# Patient Record
Sex: FEMALE | Race: WHITE | NOT HISPANIC OR LATINO | Employment: OTHER | URBAN - METROPOLITAN AREA
[De-identification: names, ages, dates, MRNs, and addresses within clinical notes are randomized per-mention and may not be internally consistent; named-entity substitution may affect disease eponyms.]

---

## 2017-02-20 RX ORDER — TOPIRAMATE 50 MG/1
TABLET, FILM COATED ORAL
Qty: 180 TAB | Refills: 0 | Status: SHIPPED | OUTPATIENT
Start: 2017-02-20 | End: 2017-04-20 | Stop reason: SDUPTHER

## 2017-02-21 RX ORDER — PAROXETINE HYDROCHLORIDE 20 MG/1
TABLET, FILM COATED ORAL
OUTPATIENT
Start: 2017-02-21

## 2017-03-20 DIAGNOSIS — G47.00 INSOMNIA, UNSPECIFIED TYPE: ICD-10-CM

## 2017-03-20 DIAGNOSIS — F41.9 ANXIETY: ICD-10-CM

## 2017-03-20 NOTE — TELEPHONE ENCOUNTER
Was the patient seen in the last year in this department? Yes     Does patient have an active prescription for medications requested? No     Received Request Via: Patient del

## 2017-03-20 NOTE — TELEPHONE ENCOUNTER
----- Message from Your Healthcare Team sent at 3/18/2017 11:26 PM PDT -----  Regarding: Prescription Question  Contact: [phone]  I will run out of medications on the following days and I do t gave refills listed on the prescription screen:  Clonazepam 0.5 mg 4/1  Paroxetine 20 mg 4/23  Amitriptyline 25 mg 4/21  Can you please fax scripts to [facility] in [place]?  I am up to date on my physicals.  Thanks

## 2017-03-21 RX ORDER — AMITRIPTYLINE HYDROCHLORIDE 25 MG/1
25 TABLET, FILM COATED ORAL NIGHTLY PRN
Qty: 90 TAB | Refills: 3 | Status: SHIPPED | OUTPATIENT
Start: 2017-03-21 | End: 2018-03-20 | Stop reason: SDUPTHER

## 2017-03-21 RX ORDER — PAROXETINE HYDROCHLORIDE 20 MG/1
20 TABLET, FILM COATED ORAL DAILY
Qty: 90 TAB | Refills: 3 | Status: SHIPPED | OUTPATIENT
Start: 2017-03-21 | End: 2018-03-20 | Stop reason: SDUPTHER

## 2017-03-21 RX ORDER — CLONAZEPAM 0.5 MG/1
TABLET ORAL
Qty: 180 TAB | Refills: 1 | Status: SHIPPED
Start: 2017-03-21 | End: 2017-09-29 | Stop reason: SDUPTHER

## 2017-04-20 RX ORDER — TOPIRAMATE 50 MG/1
50 TABLET, FILM COATED ORAL 2 TIMES DAILY
Qty: 180 TAB | Refills: 3 | Status: SHIPPED | OUTPATIENT
Start: 2017-04-20 | End: 2018-03-28 | Stop reason: SDUPTHER

## 2017-04-20 NOTE — TELEPHONE ENCOUNTER
From: Valerie Alfredo  To: Kayy Fabian M.D.  Sent: 4/20/2017 11:24 AM PDT  Subject: Medication Renewal Request    Original authorizing provider: FLORY Geiger would like a refill of the following medications:  topiramate (TOPAMAX) 50 MG tablet [Kayy Fabian M.D.]    Preferred pharmacy: [facility] [address] [facility], [place] - [facility] [address]    Comment:

## 2017-05-17 DIAGNOSIS — G43.901 MIGRAINE WITH STATUS MIGRAINOSUS, NOT INTRACTABLE, UNSPECIFIED MIGRAINE TYPE: ICD-10-CM

## 2017-05-18 RX ORDER — SUMATRIPTAN 100 MG/1
100 TABLET, FILM COATED ORAL
Qty: 18 TAB | Refills: 0 | Status: SHIPPED | OUTPATIENT
Start: 2017-05-18 | End: 2017-05-18 | Stop reason: SDUPTHER

## 2017-05-18 RX ORDER — SUMATRIPTAN 100 MG/1
100 TABLET, FILM COATED ORAL
Qty: 18 TAB | Refills: 0 | Status: SHIPPED | OUTPATIENT
Start: 2017-05-18 | End: 2017-10-18 | Stop reason: SDUPTHER

## 2017-05-18 NOTE — TELEPHONE ENCOUNTER
Was the patient seen in the last year in this department? Yes     Does patient have an active prescription for medications requested? No     Received Request Via: Pharmacy      Pt met protocol?: Yes    LAST OV 11/08/2016

## 2017-05-18 NOTE — TELEPHONE ENCOUNTER
From: Valerie Alfredo  To: Kayy Fabian M.D.  Sent: 5/17/2017 10:18 PM PDT  Subject: Medication Renewal Request    Original authorizing provider: FLORY Geiger would like a refill of the following medications:  sumatriptan (IMITREX) 100 MG tablet [Kayy Fabian M.D.]    Preferred pharmacy: [facility] - [address]    Comment:

## 2017-09-29 DIAGNOSIS — F41.9 ANXIETY: ICD-10-CM

## 2017-09-29 RX ORDER — CLONAZEPAM 0.5 MG/1
TABLET ORAL
Qty: 180 TAB | Refills: 1 | Status: SHIPPED
Start: 2017-09-29 | End: 2018-06-27 | Stop reason: SDUPTHER

## 2017-09-29 RX ORDER — CLONAZEPAM 0.5 MG/1
TABLET ORAL
Qty: 60 TAB | Refills: 2 | Status: SHIPPED
Start: 2017-09-29 | End: 2019-12-17

## 2017-09-29 NOTE — TELEPHONE ENCOUNTER
**My-chart-I have a 3 day supply of clonazepam. The screen says I have 1 refill but I can't request it. My pharmacy shows I'm out of refills. My last wellness appointment was Nov 8, 2016 and I have my next appointment scheduled on time for Nov 9. Can you refill up through my appointment date? Thank you-Valerie     Was the patient seen in the last year in this department? Yes     Does patient have an active prescription for medications requested? No     Received Request Via: Patient

## 2017-09-29 NOTE — TELEPHONE ENCOUNTER
Was the patient seen in the last year in this department? Yes 11/08/17    Does patient have an active prescription for medications requested? No     Received Request Via: Pharmacy

## 2017-10-18 DIAGNOSIS — G43.901 MIGRAINE WITH STATUS MIGRAINOSUS, NOT INTRACTABLE, UNSPECIFIED MIGRAINE TYPE: ICD-10-CM

## 2017-10-18 RX ORDER — SUMATRIPTAN 100 MG/1
100 TABLET, FILM COATED ORAL
Qty: 18 TAB | Refills: 0 | OUTPATIENT
Start: 2017-10-18

## 2017-10-18 NOTE — TELEPHONE ENCOUNTER
Was the patient seen in the last year in this department? Yes     Does patient have an active prescription for medications requested? No     Received Request Via: Patient      Pt met protocol?: Yes, last ov 11/8/16, has appt scheduled 11/6/17  Last labs 11/16

## 2017-10-18 NOTE — TELEPHONE ENCOUNTER
From: Valerie Alfredo  Sent: 10/18/2017 9:45 AM PDT  Subject: Medication Renewal Request    Valerie Alfredo would like a refill of the following medications:  sumatriptan (IMITREX) 100 MG tablet [Kayy Fabian M.D.]    Preferred pharmacy: [facility] [id number] [facility] - [place], [place] - [facility]    Comment:  Please send refill to [facility] at [facility] [phone]. I show 1 refill but they show it’s  and I’m out. Thanks

## 2017-10-19 RX ORDER — SUMATRIPTAN 100 MG/1
TABLET, FILM COATED ORAL
Qty: 18 TAB | Refills: 0 | Status: SHIPPED | OUTPATIENT
Start: 2017-10-19 | End: 2018-03-22 | Stop reason: SDUPTHER

## 2017-11-06 ENCOUNTER — HOSPITAL ENCOUNTER (OUTPATIENT)
Dept: RADIOLOGY | Facility: MEDICAL CENTER | Age: 52
End: 2017-11-06
Attending: FAMILY MEDICINE
Payer: COMMERCIAL

## 2017-11-06 DIAGNOSIS — Z12.39 SCREENING BREAST EXAMINATION: ICD-10-CM

## 2017-11-06 PROCEDURE — G0202 SCR MAMMO BI INCL CAD: HCPCS

## 2017-11-21 ENCOUNTER — OFFICE VISIT (OUTPATIENT)
Dept: MEDICAL GROUP | Facility: PHYSICIAN GROUP | Age: 52
End: 2017-11-21
Payer: COMMERCIAL

## 2017-11-21 VITALS
RESPIRATION RATE: 12 BRPM | BODY MASS INDEX: 19.49 KG/M2 | TEMPERATURE: 97.5 F | HEART RATE: 96 BPM | HEIGHT: 65 IN | SYSTOLIC BLOOD PRESSURE: 120 MMHG | DIASTOLIC BLOOD PRESSURE: 82 MMHG | OXYGEN SATURATION: 95 % | WEIGHT: 117 LBS

## 2017-11-21 DIAGNOSIS — Z00.00 WELL ADULT EXAM: ICD-10-CM

## 2017-11-21 DIAGNOSIS — Z13.29 SCREENING FOR ENDOCRINE DISORDER: ICD-10-CM

## 2017-11-21 DIAGNOSIS — G43.119 INTRACTABLE MIGRAINE WITH AURA WITHOUT STATUS MIGRAINOSUS: ICD-10-CM

## 2017-11-21 DIAGNOSIS — Z13.6 SCREENING FOR CARDIOVASCULAR CONDITION: ICD-10-CM

## 2017-11-21 DIAGNOSIS — Z11.59 ENCOUNTER FOR HEPATITIS C SCREENING TEST FOR LOW RISK PATIENT: ICD-10-CM

## 2017-11-21 DIAGNOSIS — Z23 NEED FOR TDAP VACCINATION: ICD-10-CM

## 2017-11-21 PROCEDURE — 90715 TDAP VACCINE 7 YRS/> IM: CPT | Performed by: FAMILY MEDICINE

## 2017-11-21 PROCEDURE — 99396 PREV VISIT EST AGE 40-64: CPT | Mod: 25 | Performed by: FAMILY MEDICINE

## 2017-11-21 PROCEDURE — 90471 IMMUNIZATION ADMIN: CPT | Performed by: FAMILY MEDICINE

## 2017-11-21 RX ORDER — PROMETHAZINE HYDROCHLORIDE 25 MG/1
25 SUPPOSITORY RECTAL EVERY 6 HOURS PRN
Qty: 10 SUPPOSITORY | Refills: 0 | Status: SHIPPED | OUTPATIENT
Start: 2017-11-21

## 2017-11-21 RX ORDER — HYDROMORPHONE HYDROCHLORIDE 2 MG/1
2-4 TABLET ORAL
Qty: 15 TAB | Refills: 0 | Status: SHIPPED | OUTPATIENT
Start: 2017-11-21 | End: 2017-12-07 | Stop reason: SDUPTHER

## 2017-11-21 RX ORDER — PROMETHAZINE HYDROCHLORIDE 25 MG/1
25 TABLET ORAL EVERY 6 HOURS PRN
Qty: 30 TAB | Refills: 0 | Status: SHIPPED | OUTPATIENT
Start: 2017-11-21 | End: 2020-12-18

## 2017-11-21 ASSESSMENT — PATIENT HEALTH QUESTIONNAIRE - PHQ9: CLINICAL INTERPRETATION OF PHQ2 SCORE: 0

## 2017-11-21 NOTE — PROGRESS NOTES
Chief Complaint   Patient presents with   • Annual Exam       HISTORY OF PRESENT ILLNESS: Patient is a 52 y.o. female new patient who presents today to discuss the following issues:    1. Well adult exam  Here today for annual wellness visit.  Reports good health.  Did have 4 ER visits for intractable migraines this year when she failed home treatment with imitrex.  She typically recieves anti-emetics and opioids in the ER and breaks the migraine.  She is hoping for a plan to help avoid ER.  Due for Tdap.  Requests screening labs, including Hep C.        Active Ambulatory Problems     Diagnosis Date Noted   • Migraines 10/19/2012   • Insomnia 10/19/2012   • Anxiety 10/19/2012   • Menopausal symptoms 10/19/2012   • Allergic rhinitis 10/19/2012   • Foot pain, left 10/19/2012     Resolved Ambulatory Problems     Diagnosis Date Noted   • No Resolved Ambulatory Problems     Past Medical History:   Diagnosis Date   • Insomnia    • Migraine        Allergies:Bactrim and Sulfa drugs    Current Outpatient Prescriptions   Medication Sig Dispense Refill   • HYDROmorphone (DILAUDID) 2 MG Tab Take 1-2 Tabs by mouth every 3 hours as needed for Severe Pain. 15 Tab 0   • promethazine (PHENERGAN) 25 MG Suppos Insert 1 Suppository in rectum every 6 hours as needed for Nausea/Vomiting. 10 Suppository 0   • promethazine (PHENERGAN) 25 MG Tab Take 1 Tab by mouth every 6 hours as needed for Nausea/Vomiting. 30 Tab 0   • sumatriptan (IMITREX) 100 MG tablet TAKE ONE TABLET BY MOUTH ONCE AS NEEDED FOR MIGRAINE FOR UP TO ONE DOSE 18 Tab 0   • clonazepam (KLONOPIN) 0.5 MG Tab TAKE ONE TABLET BY MOUTH TWICE DAILY AS NEEDED 60 Tab 2   • clonazepam (KLONOPIN) 0.5 MG Tab TAKE ONE TABLET BY MOUTH TWICE DAILY AS NEEDED 180 Tab 1   • topiramate (TOPAMAX) 50 MG tablet Take 1 Tab by mouth 2 times a day. Take 1 tablet by mouth  every 12 hours ( two times  daily ) 180 Tab 3   • paroxetine (PAXIL) 20 MG Tab Take 1 Tab by mouth every day. 90 Tab 3   •  "amitriptyline (ELAVIL) 25 MG Tab Take 1 Tab by mouth at bedtime as needed. FOR SLEEP 90 Tab 3     No current facility-administered medications for this visit.        Social History   Substance Use Topics   • Smoking status: Never Smoker   • Smokeless tobacco: Never Used   • Alcohol use No       Family Status   Relation Status   • Mother    • Father      Family History   Problem Relation Age of Onset   • Cancer Mother    • Other Father      scleroderma     Health Maintenance Due   Topic Date Due   • IMM DTaP/Tdap/Td Vaccine (1 - Tdap) 1984   • IMM INFLUENZA (1) 2017       ROS:  Review of Systems   Constitutional: Negative for fever, chills, weight loss and malaise/fatigue.   HENT: Negative for ear pain, nosebleeds, congestion, sore throat and neck pain.    Eyes: Negative for blurred vision.   Respiratory: Negative for cough, sputum production, shortness of breath and wheezing.    Cardiovascular: Negative for chest pain, palpitations, orthopnea and leg swelling.   Gastrointestinal: Negative for heartburn, nausea, vomiting and abdominal pain.   Genitourinary: Negative for dysuria, urgency and frequency.   Musculoskeletal: Negative for myalgias, back pain and joint pain.   Skin: Negative for rash and itching.   Neurological: Negative for dizziness, tingling, tremors, sensory change, focal weakness and headaches.   Endo/Heme/Allergies: Does not bruise/bleed easily.   Psychiatric/Behavioral: Negative for depression, suicidal ideas and memory loss.  The patient is not nervous/anxious and does have insomnia.      Exam:  Blood pressure 120/82, pulse 96, temperature 36.4 °C (97.5 °F), resp. rate 12, height 1.651 m (5' 5\"), weight 53.1 kg (117 lb), SpO2 95 %.  General:  Well nourished, well developed female in NAD  HEENT: Normocephalic and atraumatic. TMs clear bilaterally. Oropharynx is clear      without erythema and no tonsillar exudate. Nasal mucosa pink with minimal      Rhinorrhea.  EYES: " EOMI.  Conjunctiva clear.    Neck: Supple without JVD or bruit. Thyroid is not enlarged.  Pulmonary: Clear to ausculation and percussion.  Normal effort. No rales, ronchi, or wheezing.  Cardiovascular: Regular rate and rhythm without murmur, no peripheral edema  Abdomen: positive bowel sounds.  Non tender, non distended, no hepatosplenomegaly.   MSK: normal ROM in upper and lower extremities bilaterally  Psych: appropriate affect and concentration, oriented to person and place  Neuro: no unilateral weakness in upper or lower extremities.  No gait disturbance    Please note that this dictation was created using voice recognition software. I have made every reasonable attempt to correct obvious errors, but I expect that there are errors of grammar and possibly content that I did not discover before finalizing the note.    Assessment/Plan:  1. Well adult exam  Check labs  Continue healthy lifestyle.   - COMP METABOLIC PANEL; Future  - LIPID PROFILE; Future  - TSH WITH REFLEX TO FT4; Future  - VITAMIN D,25 HYDROXY; Future    2. Need for Tdap vaccination    - TDAP VACCINE =>8YO IM    3. Screening for cardiovascular condition    - COMP METABOLIC PANEL; Future  - LIPID PROFILE; Future    4. Screening for endocrine disorder  - TSH WITH REFLEX TO FT4; Future  - VITAMIN D,25 HYDROXY; Future    5. Encounter for hepatitis C screening test for low risk patient  - HEP C VIRUS ANTIBODY; Future    6. Intractable migraine with aura without status migrainosus  Will do emergency pack of both for intermittent intractable migraines as hopes to stay out of the ER. [facility] reviewed and has not had any Rx for opioids in the last 2 years.    - HYDROmorphone (DILAUDID) 2 MG Tab; Take 1-2 Tabs by mouth every 3 hours as needed for Severe Pain.  Dispense: 15 Tab; Refill: 0  - promethazine (PHENERGAN) 25 MG Suppos; Insert 1 Suppository in rectum every 6 hours as needed for Nausea/Vomiting.  Dispense: 10 Suppository; Refill: 0  - promethazine  (PHENERGAN) 25 MG Tab; Take 1 Tab by mouth every 6 hours as needed for Nausea/Vomiting.  Dispense: 30 Tab; Refill: 0    6-12 month follow up

## 2017-12-06 ENCOUNTER — PATIENT MESSAGE (OUTPATIENT)
Dept: MEDICAL GROUP | Facility: PHYSICIAN GROUP | Age: 52
End: 2017-12-06

## 2017-12-06 ENCOUNTER — TELEPHONE (OUTPATIENT)
Dept: MEDICAL GROUP | Facility: PHYSICIAN GROUP | Age: 52
End: 2017-12-06

## 2017-12-06 NOTE — TELEPHONE ENCOUNTER
1. Caller Name: [facility]                                         Call Back Number: [phone]      Patient approves a detailed voicemail message: N\A    Pharmacy states they received rxs for promethazine suppository and tablets and is asking to verify that pt should get both

## 2017-12-07 DIAGNOSIS — G43.119 INTRACTABLE MIGRAINE WITH AURA WITHOUT STATUS MIGRAINOSUS: ICD-10-CM

## 2017-12-07 RX ORDER — HYDROMORPHONE HYDROCHLORIDE 2 MG/1
2-4 TABLET ORAL
Qty: 15 TAB | Refills: 0 | Status: SHIPPED | OUTPATIENT
Start: 2017-12-07

## 2017-12-07 NOTE — TELEPHONE ENCOUNTER
Yes she should get both just in case she is unable to keep the pills down she can use the suppositories.

## 2017-12-29 LAB
25(OH)D3+25(OH)D2 SERPL-MCNC: 60.2 NG/ML (ref 30–100)
ALBUMIN SERPL-MCNC: 4.5 G/DL (ref 3.5–5.5)
ALBUMIN/GLOB SERPL: 2 {RATIO} (ref 1.2–2.2)
ALP SERPL-CCNC: 60 IU/L (ref 39–117)
ALT SERPL-CCNC: 12 IU/L (ref 0–32)
AST SERPL-CCNC: 21 IU/L (ref 0–40)
BILIRUB SERPL-MCNC: 0.2 MG/DL (ref 0–1.2)
BUN SERPL-MCNC: 13 MG/DL (ref 6–24)
BUN/CREAT SERPL: 15 (ref 9–23)
CALCIUM SERPL-MCNC: 9.4 MG/DL (ref 8.7–10.2)
CHLORIDE SERPL-SCNC: 102 MMOL/L (ref 96–106)
CHOLEST SERPL-MCNC: 215 MG/DL (ref 100–199)
CO2 SERPL-SCNC: 22 MMOL/L (ref 18–29)
COMMENT 011824: ABNORMAL
CREAT SERPL-MCNC: 0.87 MG/DL (ref 0.57–1)
GLOBULIN SER CALC-MCNC: 2.2 G/DL (ref 1.5–4.5)
GLUCOSE SERPL-MCNC: 86 MG/DL (ref 65–99)
HDLC SERPL-MCNC: 90 MG/DL
IF AFRICAN AMERICAN  100797: 89 ML/MIN/1.73
IF NON AFRICAN AMER 100791: 77 ML/MIN/1.73
LDLC SERPL CALC-MCNC: 107 MG/DL (ref 0–99)
POTASSIUM SERPL-SCNC: 4.1 MMOL/L (ref 3.5–5.2)
PROT SERPL-MCNC: 6.7 G/DL (ref 6–8.5)
SODIUM SERPL-SCNC: 139 MMOL/L (ref 134–144)
TRIGL SERPL-MCNC: 89 MG/DL (ref 0–149)
TSH SERPL DL<=0.005 MIU/L-ACNC: 2.17 UIU/ML (ref 0.45–4.5)
VLDLC SERPL CALC-MCNC: 18 MG/DL (ref 5–40)

## 2018-02-02 ENCOUNTER — OFFICE VISIT (OUTPATIENT)
Dept: URGENT CARE | Facility: PHYSICIAN GROUP | Age: 53
End: 2018-02-02
Payer: COMMERCIAL

## 2018-02-02 ENCOUNTER — HOSPITAL ENCOUNTER (OUTPATIENT)
Dept: RADIOLOGY | Facility: MEDICAL CENTER | Age: 53
End: 2018-02-02
Attending: PHYSICIAN ASSISTANT
Payer: COMMERCIAL

## 2018-02-02 VITALS
DIASTOLIC BLOOD PRESSURE: 70 MMHG | OXYGEN SATURATION: 97 % | SYSTOLIC BLOOD PRESSURE: 108 MMHG | WEIGHT: 114 LBS | RESPIRATION RATE: 18 BRPM | HEART RATE: 68 BPM | HEIGHT: 64 IN | BODY MASS INDEX: 19.46 KG/M2

## 2018-02-02 DIAGNOSIS — M25.571 ACUTE RIGHT ANKLE PAIN: ICD-10-CM

## 2018-02-02 PROCEDURE — 99214 OFFICE O/P EST MOD 30 MIN: CPT | Performed by: PHYSICIAN ASSISTANT

## 2018-02-02 PROCEDURE — 73610 X-RAY EXAM OF ANKLE: CPT | Mod: RT

## 2018-02-03 NOTE — PROGRESS NOTES
Subjective:      Valerie Alfredo is a 52 y.o. female who presents with Ankle Pain (right ankle pain, swelling)            HPI  Chief Complaint   Patient presents with   • Ankle Pain     right ankle pain, swelling       HPI:  Valerie Alfredo is a 52 y.o.female who presents with right ankle pain that started 1 week ago.  Jumped from her truck and had a rolling mechanism of injury.  Did improve and [place] Elevating improves pain.  Eversion VIRAJ.  Went to chiropractor but did not address the ankle.  Patient denies HA, SOB, chest pain, palpitations, fever, chills, or n/v/d.      Past Medical History:   Diagnosis Date   • Insomnia    • Migraine        Past Surgical History:   Procedure Laterality Date   • CATARACT EXTRACTION WITH IOL         Family History   Problem Relation Age of Onset   • Cancer Mother    • Other Father      scleroderma     No pertinent family history.    Social History     Social History   • Marital status:      Spouse name: N/A   • Number of children: N/A   • Years of education: N/A     Occupational History   • Not on file.     Social History Main Topics   • Smoking status: Never Smoker   • Smokeless tobacco: Never Used   • Alcohol use No   • Drug use: No   • Sexual activity: Yes     Partners: Male     Other Topics Concern   • Not on file     Social History Narrative   • No narrative on file         Current Outpatient Prescriptions:   •  sumatriptan, TAKE ONE TABLET BY MOUTH ONCE AS NEEDED FOR MIGRAINE FOR UP TO ONE DOSE, 2/2/2018  •  clonazePAM, TAKE ONE TABLET BY MOUTH TWICE DAILY AS NEEDED, 2/2/2018  •  topiramate, 50 mg, Oral, BID, 2/2/2018  •  PARoxetine, 20 mg, Oral, DAILY, 2/2/2018  •  amitriptyline, 25 mg, Oral, HS PRN, 2/2/2018  •  HYDROmorphone, 2-4 mg, Oral, Q3HRS PRN  •  promethazine, 25 mg, Rectal, Q6HRS PRN  •  promethazine, 25 mg, Oral, Q6HRS PRN  •  clonazePAM, TAKE ONE TABLET BY MOUTH TWICE  "DAILY AS NEEDED, 11/21/2017    Allergies   Allergen Reactions   • Bactrim Swelling   • Sulfa Drugs Swelling        Review of Systems   Musculoskeletal: Positive for falls, joint pain and myalgias.   Neurological: Negative for tingling and sensory change.   All other systems reviewed and are negative.         Objective:     /70   Pulse 68   Resp 18   Ht 1.626 m (5' 4\")   Wt 51.7 kg (114 lb)   SpO2 97%   BMI 19.57 kg/m²      Physical Exam       Nursing note and vitals reviewed.    Constitutional:  Appropriately groomed, pleasant affect, well nourished, and in no acute distress.    HEENT:  Head: Atraumatic, normocephalic.    Ears:  Hearing grossly intact to voice.    Eyes:  Conjunctivae clear, sclera white, and medial canthus without exudate bilaterally.    Lungs:  Lungs with normal respiratory excursion and effort.      Right Ankle:  Mild edema, no erythema, or ecchymosis present.  Point tenderness of the medial malleolus posterior aspect and deltoid ligaments.       Limited range of motion due to pain for: Dorsiflexion, plantar flexion, inversion, eversion.      Motor Examination: Dorsi/plantar flexion 5/5 without atrophy.       Negative Anterior/Posterior drawer.      Sensation equal bilaterally to light touch for L4, L5, and S1.      DTR 2+ for Achilles and patella bilaterally.  NVS intact, DP 2+ bilaterally.  Gait and station wnl, non antalgic.    Derm:  No rashes or lesions with good turgor pressure.     Psychiatric:  Normal judgement, mood and affect.       2/2/2018 4:45 PM    HISTORY/REASON FOR EXAM:  Right ankle pain medially after rolling ankle one week ago.      TECHNIQUE/EXAM DESCRIPTION AND NUMBER OF VIEWS:  3 views of the RIGHT ankle.    COMPARISON: None    FINDINGS:  The alignment of the ankle is normal.  There is posterior soft tissue swelling.  There is no evidence of displaced fracture or dislocation.     Impression       Negative RIGHT ankle series.   Reading Provider Reading Date "   Cole Padilla M.D. Feb 2, 2018   Signing Provider Signing Date Signing Time   Cole Padilla M.D. Feb 2, 2018  4:56 PM        Assessment/Plan:     1. Acute right ankle pain  DX-ANKLE 3+ VIEWS RIGHT      Patient presents with acute right ankle pain. Point tenderness over the posterior medial malleolus and deltoid ligament. Some edema and no ecchymosis at this time. Patient does have pain with ambulation but able to bear weight. Given posterior tibial pain did obtain an x-ray which was negative for fracture. Suspect deltoid ligament strain and placed patient in ankle brace. Recommended ice and elevation. Follow with PCP or orthopedics as necessary. Patient deferred sports referral at this time.    Patient was in agreement with this treatment plan and seemed to understand without barriers. All questions were encouraged and answered.  Reviewed signs and symptoms of when to seek emergency medical care.     Please note that this dictation was created using voice recognition software.  I have made every reasonable attempt to correct obvious errors, but I expect there are errors of adis and possibly content that I did not discover before finalizing the note.

## 2018-02-06 ASSESSMENT — ENCOUNTER SYMPTOMS
SENSORY CHANGE: 0
TINGLING: 0
FALLS: 1
MYALGIAS: 1

## 2018-03-20 DIAGNOSIS — G47.00 INSOMNIA, UNSPECIFIED TYPE: ICD-10-CM

## 2018-03-20 DIAGNOSIS — F41.9 ANXIETY: ICD-10-CM

## 2018-03-21 RX ORDER — AMITRIPTYLINE HYDROCHLORIDE 25 MG/1
25 TABLET, FILM COATED ORAL NIGHTLY PRN
Qty: 90 TAB | Refills: 1 | Status: SHIPPED | OUTPATIENT
Start: 2018-03-21 | End: 2018-09-19 | Stop reason: SDUPTHER

## 2018-03-21 RX ORDER — PAROXETINE HYDROCHLORIDE 20 MG/1
TABLET, FILM COATED ORAL
Qty: 90 TAB | Refills: 1 | Status: SHIPPED | OUTPATIENT
Start: 2018-03-21 | End: 2018-09-19 | Stop reason: SDUPTHER

## 2018-03-22 DIAGNOSIS — G43.901 MIGRAINE WITH STATUS MIGRAINOSUS, NOT INTRACTABLE, UNSPECIFIED MIGRAINE TYPE: ICD-10-CM

## 2018-03-22 NOTE — TELEPHONE ENCOUNTER
Was the patient seen in the last year in this department? Yes     Does patient have an active prescription for medications requested? No     Received Request Via: Pharmacy      Pt met protocol?: Yes    LAST OV 11/21/2017

## 2018-03-23 RX ORDER — SUMATRIPTAN 100 MG/1
TABLET, FILM COATED ORAL
Qty: 18 TAB | Refills: 1 | Status: SHIPPED | OUTPATIENT
Start: 2018-03-23 | End: 2019-02-21 | Stop reason: SDUPTHER

## 2018-03-28 DIAGNOSIS — F41.9 ANXIETY: ICD-10-CM

## 2018-03-29 RX ORDER — CLONAZEPAM 0.5 MG/1
TABLET ORAL
Qty: 180 TAB | Refills: 0 | Status: SHIPPED
Start: 2018-03-29 | End: 2018-04-28

## 2018-03-29 RX ORDER — TOPIRAMATE 50 MG/1
50 TABLET, FILM COATED ORAL 2 TIMES DAILY
Qty: 180 TAB | Refills: 1 | Status: SHIPPED | OUTPATIENT
Start: 2018-03-29 | End: 2018-09-11 | Stop reason: SDUPTHER

## 2018-03-29 NOTE — TELEPHONE ENCOUNTER
Was the patient seen in the last year in this department? Yes 11/2017    Does patient have an active prescription for medications requested? No     Received Request Via: Patient

## 2018-06-26 DIAGNOSIS — F41.9 ANXIETY: ICD-10-CM

## 2018-06-27 RX ORDER — CLONAZEPAM 0.5 MG/1
TABLET ORAL
Refills: 0 | OUTPATIENT
Start: 2018-06-27

## 2018-06-27 RX ORDER — CLONAZEPAM 0.5 MG/1
TABLET ORAL
Qty: 180 TAB | Refills: 0 | Status: SHIPPED
Start: 2018-06-27 | End: 2018-09-27

## 2018-06-27 NOTE — TELEPHONE ENCOUNTER
Please call Valerie.  I did renew it for 3 months, but she needs to be seen in the next 3 months to keep it going.

## 2018-06-27 NOTE — TELEPHONE ENCOUNTER
Was the patient seen in the last year in this department? Yes 11/21/2017    Does patient have an active prescription for medications requested? No     Received Request Via: Patient

## 2018-07-31 ENCOUNTER — OFFICE VISIT (OUTPATIENT)
Dept: MEDICAL GROUP | Facility: PHYSICIAN GROUP | Age: 53
End: 2018-07-31
Payer: COMMERCIAL

## 2018-07-31 VITALS
SYSTOLIC BLOOD PRESSURE: 104 MMHG | TEMPERATURE: 97.7 F | DIASTOLIC BLOOD PRESSURE: 72 MMHG | WEIGHT: 114.8 LBS | RESPIRATION RATE: 14 BRPM | OXYGEN SATURATION: 96 % | BODY MASS INDEX: 19.6 KG/M2 | HEART RATE: 90 BPM | HEIGHT: 64 IN

## 2018-07-31 DIAGNOSIS — G43.909 MIGRAINE WITHOUT STATUS MIGRAINOSUS, NOT INTRACTABLE, UNSPECIFIED MIGRAINE TYPE: ICD-10-CM

## 2018-07-31 DIAGNOSIS — F51.01 PRIMARY INSOMNIA: ICD-10-CM

## 2018-07-31 DIAGNOSIS — N95.1 MENOPAUSAL SYMPTOMS: ICD-10-CM

## 2018-07-31 PROCEDURE — 99214 OFFICE O/P EST MOD 30 MIN: CPT | Performed by: FAMILY MEDICINE

## 2018-07-31 NOTE — PROGRESS NOTES
Chief Complaint   Patient presents with   • Migraine     fv clonazepam       HISTORY OF PRESENT ILLNESS: Patient is a 53 y.o. female established patient here today for the following concerns:    1. Migraine without status migrainosus, not intractable, unspecified migraine type  Here for follow up today.  Did just get a piercing done to try to help prevent migraines a few weeks ago.  Unsure its helping or not.  She is interested in stopping the clonazepam.  She was originally prescribed it to help with her migraines, but reports does not feel it is helping much at all and notes that if she runs out of it or skips taking it for more than 2 days she gets some increase in anxiety and withdrawal symptoms.  She would like to discontinue it.  She remains on Topamax and amitriptyline.      2. Menopausal symptoms    3. Primary insomnia  Reports that she has noted trouble sleeping primarily since starting menopause.  The amitriptyline does not always help, but has been working on trying to improve her sleep hygiene which has been difficult with her  watching TV at night and early in the morning.       Past Medical, Social, and Family history reviewed and updated in EPIC    Allergies:Bactrim and Sulfa drugs    Current Outpatient Prescriptions   Medication Sig Dispense Refill   • clonazePAM (KLONOPIN) 0.5 MG Tab TAKE ONE TABLET BY MOUTH TWICE DAILY AS NEEDED 180 Tab 0   • topiramate (TOPAMAX) 50 MG tablet Take 1 Tab by mouth 2 times a day. 180 Tab 1   • sumatriptan (IMITREX) 100 MG tablet TAKE ONE TABLET BY MOUTH ONCE AS NEEDED FOR MIGRAINE FOR UP TO ONE DOSE 18 Tab 1   • PARoxetine (PAXIL) 20 MG Tab TAKE 1 TABLET BY MOUTH EVERY DAY 90 Tab 1   • amitriptyline (ELAVIL) 25 MG Tab Take 1 Tab by mouth at bedtime as needed for Sleep. 90 Tab 1   • HYDROmorphone (DILAUDID) 2 MG Tab Take 1-2 Tabs by mouth every 3 hours as needed for Severe Pain. 15 Tab 0   • promethazine (PHENERGAN) 25 MG Suppos Insert 1 Suppository in rectum  "every 6 hours as needed for Nausea/Vomiting. 10 Suppository 0   • promethazine (PHENERGAN) 25 MG Tab Take 1 Tab by mouth every 6 hours as needed for Nausea/Vomiting. 30 Tab 0   • clonazepam (KLONOPIN) 0.5 MG Tab TAKE ONE TABLET BY MOUTH TWICE DAILY AS NEEDED 60 Tab 2     No current facility-administered medications for this visit.          ROS:  Review of Systems   Constitutional: Negative for fever, chills, weight loss and malaise/fatigue.   HENT: Negative for ear pain, nosebleeds, congestion, sore throat and neck pain.    Eyes: Negative for blurred vision.   Respiratory: Negative for cough, sputum production, shortness of breath and wheezing.    Cardiovascular: Negative for chest pain, palpitations,  and leg swelling.   Gastrointestinal: Negative for heartburn, nausea, vomiting, diarrhea and abdominal pain.   Genitourinary: Negative for dysuria, urgency and frequency.   Musculoskeletal: Negative for myalgias, back pain and joint pain.   Skin: Negative for rash and itching.   Neurological: Negative for dizziness, tingling, tremors, sensory change, focal weakness and headaches.   Endo/Heme/Allergies: Does not bruise/bleed easily.   Psychiatric/Behavioral: Negative for depression, anxiety, suicidal ideas, insomnia and memory loss.      Exam:  Blood pressure 104/72, pulse 90, temperature 36.5 °C (97.7 °F), resp. rate 14, height 1.626 m (5' 4\"), weight 52.1 kg (114 lb 12.8 oz), SpO2 96 %.    General:  Well nourished, well developed in NAD  Head is grossly normal.  Neck: Supple without JVD   Pulmonary:  Normal effort.   Cardiovascular: Regular rate  Extremities: no clubbing, cyanosis, or edema.  Psych: affect appropriate      Please note that this dictation was created using voice recognition software. I have made every reasonable attempt to correct obvious errors, but I expect that there are errors of grammar and possibly content that I did not discover before finalizing the note.    Assessment/Plan:  1. Migraine " without status migrainosus, not intractable, unspecified migraine type  Discussed discontinuing the clonazepam with taper.     2. Menopausal symptoms  Discussed ongoing use of paxil for symptoms    3. Primary insomnia  Continue Elavil for migraines and insomnia.    3 month follow up, sooner prn.

## 2018-08-21 DIAGNOSIS — F41.9 ANXIETY: ICD-10-CM

## 2018-09-12 RX ORDER — TOPIRAMATE 50 MG/1
50 TABLET, FILM COATED ORAL 2 TIMES DAILY
Qty: 180 TAB | Refills: 1 | Status: SHIPPED | OUTPATIENT
Start: 2018-09-12 | End: 2018-11-29 | Stop reason: SDUPTHER

## 2018-09-12 NOTE — TELEPHONE ENCOUNTER
Was the patient seen in the last year in this department? Yes    Does patient have an active prescription for medications requested? No     Received Request Via: Pharmacy      Pt met protocol?: Yes    LAST OV 07/31/2018

## 2018-11-13 ENCOUNTER — HOSPITAL ENCOUNTER (OUTPATIENT)
Dept: RADIOLOGY | Facility: MEDICAL CENTER | Age: 53
End: 2018-11-13
Attending: FAMILY MEDICINE
Payer: COMMERCIAL

## 2018-11-13 DIAGNOSIS — Z12.31 VISIT FOR SCREENING MAMMOGRAM: ICD-10-CM

## 2018-11-13 PROCEDURE — 77067 SCR MAMMO BI INCL CAD: CPT

## 2018-11-29 ENCOUNTER — OFFICE VISIT (OUTPATIENT)
Dept: MEDICAL GROUP | Facility: PHYSICIAN GROUP | Age: 53
End: 2018-11-29
Payer: COMMERCIAL

## 2018-11-29 VITALS
TEMPERATURE: 97.8 F | HEART RATE: 104 BPM | HEIGHT: 64 IN | RESPIRATION RATE: 14 BRPM | BODY MASS INDEX: 19.46 KG/M2 | DIASTOLIC BLOOD PRESSURE: 80 MMHG | WEIGHT: 114 LBS | SYSTOLIC BLOOD PRESSURE: 114 MMHG | OXYGEN SATURATION: 97 %

## 2018-11-29 DIAGNOSIS — J30.9 ALLERGIC RHINITIS, UNSPECIFIED SEASONALITY, UNSPECIFIED TRIGGER: ICD-10-CM

## 2018-11-29 DIAGNOSIS — G47.00 INSOMNIA, UNSPECIFIED TYPE: ICD-10-CM

## 2018-11-29 DIAGNOSIS — R00.0 TACHYCARDIA: ICD-10-CM

## 2018-11-29 DIAGNOSIS — Z13.0 SCREENING FOR DEFICIENCY ANEMIA: ICD-10-CM

## 2018-11-29 DIAGNOSIS — F41.9 ANXIETY: ICD-10-CM

## 2018-11-29 DIAGNOSIS — G43.909 MIGRAINE WITHOUT STATUS MIGRAINOSUS, NOT INTRACTABLE, UNSPECIFIED MIGRAINE TYPE: ICD-10-CM

## 2018-11-29 DIAGNOSIS — Z13.29 SCREENING FOR ENDOCRINE DISORDER: ICD-10-CM

## 2018-11-29 DIAGNOSIS — Z13.6 SCREENING FOR CARDIOVASCULAR CONDITION: ICD-10-CM

## 2018-11-29 DIAGNOSIS — Z00.00 WELL ADULT EXAM: ICD-10-CM

## 2018-11-29 PROCEDURE — 99396 PREV VISIT EST AGE 40-64: CPT | Performed by: FAMILY MEDICINE

## 2018-11-29 RX ORDER — AMITRIPTYLINE HYDROCHLORIDE 25 MG/1
25 TABLET, FILM COATED ORAL NIGHTLY PRN
Qty: 90 TAB | Refills: 3 | Status: SHIPPED | OUTPATIENT
Start: 2018-11-29 | End: 2019-11-29 | Stop reason: SDUPTHER

## 2018-11-29 RX ORDER — TOPIRAMATE 50 MG/1
50 TABLET, FILM COATED ORAL 2 TIMES DAILY
Qty: 180 TAB | Refills: 3 | Status: SHIPPED | OUTPATIENT
Start: 2018-11-29 | End: 2019-12-11

## 2018-11-29 RX ORDER — FLUTICASONE PROPIONATE 50 MCG
2 SPRAY, SUSPENSION (ML) NASAL DAILY
Qty: 16 G | Refills: 11 | Status: SHIPPED | OUTPATIENT
Start: 2018-11-29 | End: 2019-11-04 | Stop reason: SDUPTHER

## 2018-11-29 RX ORDER — PAROXETINE HYDROCHLORIDE 20 MG/1
20 TABLET, FILM COATED ORAL
Qty: 90 TAB | Refills: 3 | Status: SHIPPED | OUTPATIENT
Start: 2018-11-29 | End: 2019-11-29 | Stop reason: SDUPTHER

## 2018-11-29 ASSESSMENT — PATIENT HEALTH QUESTIONNAIRE - PHQ9: CLINICAL INTERPRETATION OF PHQ2 SCORE: 0

## 2018-11-29 NOTE — PROGRESS NOTES
Chief Complaint   Patient presents with   • Annual Exam       HISTORY OF PRESENT ILLNESS: Patient is a 53 y.o. female new patient who presents today to discuss the following issues:    1. Well adult exam  Here for annual preventive visit.  Up to date on her HM topics.  Due for updated labs.  Has noted increase in allergy symptoms, has been taking pseudophed but notes that her HR has been staying up even on the days that she is not taking the pseudophed.  She is concerned because she has never noted that this has been a problem in the past.      Needs med refill.        Active Ambulatory Problems     Diagnosis Date Noted   • Migraines 10/19/2012   • Insomnia 10/19/2012   • Menopausal symptoms 10/19/2012   • Allergic rhinitis 10/19/2012   • Foot pain, left 10/19/2012     Resolved Ambulatory Problems     Diagnosis Date Noted   • Anxiety 10/19/2012     Past Medical History:   Diagnosis Date   • Insomnia    • Migraine        Allergies:Bactrim and Sulfa drugs    Current Outpatient Prescriptions   Medication Sig Dispense Refill   • fluticasone (FLONASE) 50 MCG/ACT nasal spray Spray 2 Sprays in nose every day. Each Nostril 16 g 11   • PARoxetine (PAXIL) 20 MG Tab Take 1 Tab by mouth every day. 90 Tab 3   • amitriptyline (ELAVIL) 25 MG Tab Take 1 Tab by mouth at bedtime as needed for Sleep. 90 Tab 3   • topiramate (TOPAMAX) 50 MG tablet Take 1 Tab by mouth 2 times a day. 180 Tab 3   • clonazepam (KLONOPIN) 0.5 MG Tab TAKE ONE TABLET BY MOUTH TWICE DAILY AS NEEDED 60 Tab 2   • sumatriptan (IMITREX) 100 MG tablet TAKE ONE TABLET BY MOUTH ONCE AS NEEDED FOR MIGRAINE FOR UP TO ONE DOSE 18 Tab 1   • HYDROmorphone (DILAUDID) 2 MG Tab Take 1-2 Tabs by mouth every 3 hours as needed for Severe Pain. 15 Tab 0   • promethazine (PHENERGAN) 25 MG Suppos Insert 1 Suppository in rectum every 6 hours as needed for Nausea/Vomiting. 10 Suppository 0   • promethazine (PHENERGAN) 25 MG Tab Take 1 Tab by mouth every 6 hours as needed for  "Nausea/Vomiting. 30 Tab 0     No current facility-administered medications for this visit.        Social History   Substance Use Topics   • Smoking status: Never Smoker   • Smokeless tobacco: Never Used   • Alcohol use No       Family Status   Relation Status   • Mo    • Fa      Family History   Problem Relation Age of Onset   • Cancer Mother    • Other Father         scleroderma     There are no preventive care reminders to display for this patient.    ROS:  Review of Systems   Constitutional: Negative for fever, chills, weight loss and malaise/fatigue.   HENT: Negative for ear pain, nosebleeds, congestion, sore throat and neck pain.    Eyes: Negative for blurred vision.   Respiratory: Negative for cough, sputum production, shortness of breath and wheezing.    Cardiovascular: Negative for chest pain, palpitations, orthopnea and leg swelling.   Gastrointestinal: Negative for heartburn, nausea, vomiting and abdominal pain.   Genitourinary: Negative for dysuria, urgency and frequency.   Musculoskeletal: Negative for myalgias, back pain and joint pain.   Skin: Negative for rash and itching.   Neurological: Negative for dizziness, tingling, tremors, sensory change, focal weakness and headaches.   Endo/Heme/Allergies: Does not bruise/bleed easily.   Psychiatric/Behavioral: Negative for depression, suicidal ideas and memory loss.  The patient is not nervous/anxious and does not have insomnia.      Exam:  Blood pressure 114/80, pulse (!) 104, temperature 36.6 °C (97.8 °F), resp. rate 14, height 1.626 m (5' 4\"), weight 51.7 kg (114 lb), SpO2 97 %.  General:  Well nourished, well developed female in NAD  HEENT: Normocephalic and atraumatic. TMs clear bilaterally. Oropharynx is clear      without erythema and no tonsillar exudate. Nasal mucosa pink with minimal      Rhinorrhea.  EYES: EOMI.  Conjunctiva clear.    Neck: Supple without JVD or bruit. Thyroid is not enlarged.  Pulmonary: Clear to ausculation and " percussion.  Normal effort. No rales, ronchi, or wheezing.  Cardiovascular: Regular rate and rhythm without murmur, no peripheral edema  Abdomen: positive bowel sounds.  Non tender, non distended, no hepatosplenomegaly.   MSK: normal ROM in upper and lower extremities bilaterally  Psych: appropriate affect and concentration, oriented to person and place  Neuro: no unilateral weakness in upper or lower extremities.  No gait disturbance    Please note that this dictation was created using voice recognition software. I have made every reasonable attempt to correct obvious errors, but I expect that there are errors of grammar and possibly content that I did not discover before finalizing the note.    Assessment/Plan:  1. Well adult exam  Continue healthy lifestyle.      - COMP METABOLIC PANEL; Future  - TSH WITH REFLEX TO FT4; Future  - VITAMIN D,25 HYDROXY; Future  - Lipid Profile; Future  - CBC WITH DIFFERENTIAL; Future    2. Allergic rhinitis, unspecified seasonality, unspecified trigger  - fluticasone (FLONASE) 50 MCG/ACT nasal spray; Spray 2 Sprays in nose every day. Each Nostril  Dispense: 16 g; Refill: 11    3. Tachycardia  - REFERRAL TO CARDIOLOGY    4. Screening for deficiency anemia  - CBC WITH DIFFERENTIAL; Future    5. Screening for endocrine disorder  - TSH WITH REFLEX TO FT4; Future  - VITAMIN D,25 HYDROXY; Future    6. Screening for cardiovascular condition  - COMP METABOLIC PANEL; Future  - Lipid Profile; Future    7. Anxiety  - PARoxetine (PAXIL) 20 MG Tab; Take 1 Tab by mouth every day.  Dispense: 90 Tab; Refill: 3    8. Insomnia, unspecified type  - amitriptyline (ELAVIL) 25 MG Tab; Take 1 Tab by mouth at bedtime as needed for Sleep.  Dispense: 90 Tab; Refill: 3    9. Migraine without status migrainosus, not intractable, unspecified migraine type  - amitriptyline (ELAVIL) 25 MG Tab; Take 1 Tab by mouth at bedtime as needed for Sleep.  Dispense: 90 Tab; Refill: 3  - topiramate (TOPAMAX) 50 MG tablet;  Take 1 Tab by mouth 2 times a day.  Dispense: 180 Tab; Refill: 3      Follow up 6-12 months.

## 2018-12-24 ENCOUNTER — HOSPITAL ENCOUNTER (OUTPATIENT)
Dept: LAB | Facility: MEDICAL CENTER | Age: 53
End: 2018-12-24
Attending: FAMILY MEDICINE
Payer: COMMERCIAL

## 2018-12-24 DIAGNOSIS — Z13.29 SCREENING FOR ENDOCRINE DISORDER: ICD-10-CM

## 2018-12-24 DIAGNOSIS — Z13.6 SCREENING FOR CARDIOVASCULAR CONDITION: ICD-10-CM

## 2018-12-24 DIAGNOSIS — Z00.00 WELL ADULT EXAM: ICD-10-CM

## 2018-12-24 DIAGNOSIS — Z13.0 SCREENING FOR DEFICIENCY ANEMIA: ICD-10-CM

## 2018-12-24 LAB
25(OH)D3 SERPL-MCNC: 75 NG/ML (ref 30–100)
ALBUMIN SERPL BCP-MCNC: 4.4 G/DL (ref 3.2–4.9)
ALBUMIN/GLOB SERPL: 1.7 G/DL
ALP SERPL-CCNC: 61 U/L (ref 30–99)
ALT SERPL-CCNC: 15 U/L (ref 2–50)
ANION GAP SERPL CALC-SCNC: 8 MMOL/L (ref 0–11.9)
AST SERPL-CCNC: 20 U/L (ref 12–45)
BASOPHILS # BLD AUTO: 1.5 % (ref 0–1.8)
BASOPHILS # BLD: 0.09 K/UL (ref 0–0.12)
BILIRUB SERPL-MCNC: 0.4 MG/DL (ref 0.1–1.5)
BUN SERPL-MCNC: 18 MG/DL (ref 8–22)
CALCIUM SERPL-MCNC: 9.6 MG/DL (ref 8.5–10.5)
CHLORIDE SERPL-SCNC: 105 MMOL/L (ref 96–112)
CHOLEST SERPL-MCNC: 179 MG/DL (ref 100–199)
CO2 SERPL-SCNC: 25 MMOL/L (ref 20–33)
CREAT SERPL-MCNC: 0.86 MG/DL (ref 0.5–1.4)
EOSINOPHIL # BLD AUTO: 0.37 K/UL (ref 0–0.51)
EOSINOPHIL NFR BLD: 6.3 % (ref 0–6.9)
ERYTHROCYTE [DISTWIDTH] IN BLOOD BY AUTOMATED COUNT: 42.5 FL (ref 35.9–50)
GLOBULIN SER CALC-MCNC: 2.6 G/DL (ref 1.9–3.5)
GLUCOSE SERPL-MCNC: 86 MG/DL (ref 65–99)
HCT VFR BLD AUTO: 41.4 % (ref 37–47)
HDLC SERPL-MCNC: 76 MG/DL
HGB BLD-MCNC: 13.5 G/DL (ref 12–16)
IMM GRANULOCYTES # BLD AUTO: 0.01 K/UL (ref 0–0.11)
IMM GRANULOCYTES NFR BLD AUTO: 0.2 % (ref 0–0.9)
LDLC SERPL CALC-MCNC: 88 MG/DL
LYMPHOCYTES # BLD AUTO: 1.8 K/UL (ref 1–4.8)
LYMPHOCYTES NFR BLD: 30.8 % (ref 22–41)
MCH RBC QN AUTO: 30.8 PG (ref 27–33)
MCHC RBC AUTO-ENTMCNC: 32.6 G/DL (ref 33.6–35)
MCV RBC AUTO: 94.3 FL (ref 81.4–97.8)
MONOCYTES # BLD AUTO: 0.41 K/UL (ref 0–0.85)
MONOCYTES NFR BLD AUTO: 7 % (ref 0–13.4)
NEUTROPHILS # BLD AUTO: 3.17 K/UL (ref 2–7.15)
NEUTROPHILS NFR BLD: 54.2 % (ref 44–72)
NRBC # BLD AUTO: 0 K/UL
NRBC BLD-RTO: 0 /100 WBC
PLATELET # BLD AUTO: 307 K/UL (ref 164–446)
PMV BLD AUTO: 10.3 FL (ref 9–12.9)
POTASSIUM SERPL-SCNC: 3.8 MMOL/L (ref 3.6–5.5)
PROT SERPL-MCNC: 7 G/DL (ref 6–8.2)
RBC # BLD AUTO: 4.39 M/UL (ref 4.2–5.4)
SODIUM SERPL-SCNC: 138 MMOL/L (ref 135–145)
T4 FREE SERPL-MCNC: 1.1 NG/DL (ref 0.53–1.43)
TRIGL SERPL-MCNC: 75 MG/DL (ref 0–149)
TSH SERPL DL<=0.005 MIU/L-ACNC: 0.01 UIU/ML (ref 0.38–5.33)
WBC # BLD AUTO: 5.9 K/UL (ref 4.8–10.8)

## 2018-12-24 PROCEDURE — 82306 VITAMIN D 25 HYDROXY: CPT

## 2018-12-24 PROCEDURE — 36415 COLL VENOUS BLD VENIPUNCTURE: CPT

## 2018-12-24 PROCEDURE — 85025 COMPLETE CBC W/AUTO DIFF WBC: CPT

## 2018-12-24 PROCEDURE — 80061 LIPID PANEL: CPT

## 2018-12-24 PROCEDURE — 80053 COMPREHEN METABOLIC PANEL: CPT

## 2018-12-24 PROCEDURE — 84443 ASSAY THYROID STIM HORMONE: CPT

## 2018-12-24 PROCEDURE — 84439 ASSAY OF FREE THYROXINE: CPT

## 2018-12-25 ENCOUNTER — PATIENT MESSAGE (OUTPATIENT)
Dept: MEDICAL GROUP | Facility: PHYSICIAN GROUP | Age: 53
End: 2018-12-25

## 2018-12-26 ENCOUNTER — PATIENT MESSAGE (OUTPATIENT)
Dept: MEDICAL GROUP | Facility: PHYSICIAN GROUP | Age: 53
End: 2018-12-26

## 2018-12-26 DIAGNOSIS — E05.90 HYPERTHYROIDISM: ICD-10-CM

## 2019-01-08 ENCOUNTER — OFFICE VISIT (OUTPATIENT)
Dept: CARDIOLOGY | Facility: MEDICAL CENTER | Age: 54
End: 2019-01-08
Payer: COMMERCIAL

## 2019-01-08 VITALS
SYSTOLIC BLOOD PRESSURE: 116 MMHG | HEART RATE: 110 BPM | WEIGHT: 110.23 LBS | HEIGHT: 64 IN | BODY MASS INDEX: 18.82 KG/M2 | OXYGEN SATURATION: 98 % | DIASTOLIC BLOOD PRESSURE: 78 MMHG

## 2019-01-08 DIAGNOSIS — R00.0 SINUS TACHYCARDIA: ICD-10-CM

## 2019-01-08 LAB — EKG IMPRESSION: NORMAL

## 2019-01-08 PROCEDURE — 93000 ELECTROCARDIOGRAM COMPLETE: CPT | Performed by: INTERNAL MEDICINE

## 2019-01-08 PROCEDURE — 99204 OFFICE O/P NEW MOD 45 MIN: CPT | Mod: 25 | Performed by: INTERNAL MEDICINE

## 2019-01-08 ASSESSMENT — ENCOUNTER SYMPTOMS
POLYDIPSIA: 1
NERVOUS/ANXIOUS: 1
DIAPHORESIS: 1
COUGH: 1

## 2019-01-08 NOTE — PROGRESS NOTES
Chief Complaint   Patient presents with   • Tachycardia       Subjective:   Valerie Alfredo is a 53 -year-old woman referred for evaluation of tachycardia.    She tells me today about diaphoresis and an increase in her resting heart rate detected on her wrist watch, which is a heart rate monitor.  She tells me that her average heart rate is somewhere around 100s beats per minute range, and describes to me previously exercising on a treadmill at 0% grade alternating walking and running at about 4.7 mph.  She tells me more recently [place] with [place] heart rate of around 135 bpm though the details of her exact heart rate are not entirely clear to her as there is some data missing between the 2 types of exercise it seems.    She has no other cardiovascular complaints.  She is a lifetime non-smoker and a vegetarian.    Past Medical History:   Diagnosis Date   • Insomnia    • Migraine      Past Surgical History:   Procedure Laterality Date   • CATARACT EXTRACTION WITH IOL       Family History   Problem Relation Age of Onset   • Cancer Mother    • Other Father         scleroderma   • Hypertension Father    • Heart Disease Neg Hx      Social History     Social History   • Marital status:      Spouse name: N/A   • Number of children: N/A   • Years of education: N/A     Occupational History   • Not on file.     Social History Main Topics   • Smoking status: Never Smoker   • Smokeless tobacco: Never Used   • Alcohol use No   • Drug use: No   • Sexual activity: Yes     Partners: Male     Other Topics Concern   • Not on file     Social History Narrative   • No narrative on file     Allergies   Allergen Reactions   • Bactrim Swelling   • Sulfa Drugs Swelling     Outpatient Encounter Prescriptions as of 1/8/2019   Medication Sig Dispense Refill   • fluticasone (FLONASE) 50 MCG/ACT nasal spray Spray 2 Sprays in nose every day. Each Nostril 16 g 11   • PARoxetine (PAXIL) 20 MG Tab Take 1  "Tab by mouth every day. 90 Tab 3   • amitriptyline (ELAVIL) 25 MG Tab Take 1 Tab by mouth at bedtime as needed for Sleep. 90 Tab 3   • topiramate (TOPAMAX) 50 MG tablet Take 1 Tab by mouth 2 times a day. 180 Tab 3   • sumatriptan (IMITREX) 100 MG tablet TAKE ONE TABLET BY MOUTH ONCE AS NEEDED FOR MIGRAINE FOR UP TO ONE DOSE 18 Tab 1   • HYDROmorphone (DILAUDID) 2 MG Tab Take 1-2 Tabs by mouth every 3 hours as needed for Severe Pain. 15 Tab 0   • promethazine (PHENERGAN) 25 MG Suppos Insert 1 Suppository in rectum every 6 hours as needed for Nausea/Vomiting. 10 Suppository 0   • promethazine (PHENERGAN) 25 MG Tab Take 1 Tab by mouth every 6 hours as needed for Nausea/Vomiting. 30 Tab 0   • clonazepam (KLONOPIN) 0.5 MG Tab TAKE ONE TABLET BY MOUTH TWICE DAILY AS NEEDED (Patient not taking: Reported on 1/8/2019) 60 Tab 2     No facility-administered encounter medications on file as of 1/8/2019.      Review of Systems   Constitutional: Positive for diaphoresis and malaise/fatigue.   HENT: Positive for congestion.    Respiratory: Positive for cough.    Endo/Heme/Allergies: Positive for environmental allergies and polydipsia.   Psychiatric/Behavioral: The patient is nervous/anxious.    All other systems reviewed and are negative.       Objective:   /78 (BP Location: Right arm, Patient Position: Sitting, BP Cuff Size: Adult)   Pulse (!) 110   Ht 1.626 m (5' 4\")   Wt 50 kg (110 lb 3.7 oz)   SpO2 98%   BMI 18.92 kg/m²     Physical Exam   Constitutional: She is oriented to person, place, and time. She appears well-developed and well-nourished. No distress.   Pleasant, in no distress   Eyes: Pupils are equal, round, and reactive to light. EOM are normal. No scleral icterus.   Neck: No JVD present.   Cardiovascular: Normal rate, regular rhythm, normal heart sounds and intact distal pulses.  Exam reveals no gallop and no friction rub.    No murmur heard.  No carotid bruits   Pulmonary/Chest: Effort normal and breath " sounds normal. No respiratory distress. She has no wheezes. She has no rales.   Abdominal: Soft. Bowel sounds are normal. She exhibits no distension.   Musculoskeletal: She exhibits no edema (No lower extremity edema bilaterally).   Neurological: She is alert and oriented to person, place, and time.   Skin: Skin is warm and dry. No rash noted. She is not diaphoretic. No erythema. No pallor.   Psychiatric: She has a normal mood and affect. Judgment and thought content normal.   Vitals reviewed.    Lab Results   Component Value Date/Time    WBC 5.9 12/24/2018 09:53 AM    RBC 4.39 12/24/2018 09:53 AM    HEMOGLOBIN 13.5 12/24/2018 09:53 AM    HEMATOCRIT 41.4 12/24/2018 09:53 AM    MCV 94.3 12/24/2018 09:53 AM    MCH 30.8 12/24/2018 09:53 AM    MCHC 32.6 (L) 12/24/2018 09:53 AM    MPV 10.3 12/24/2018 09:53 AM        Lab Results   Component Value Date/Time    SODIUM 138 12/24/2018 09:53 AM    POTASSIUM 3.8 12/24/2018 09:53 AM    CHLORIDE 105 12/24/2018 09:53 AM    CO2 25 12/24/2018 09:53 AM    GLUCOSE 86 12/24/2018 09:53 AM    BUN 18 12/24/2018 09:53 AM    CREATININE 0.86 12/24/2018 09:53 AM    BUNCREATRAT 15 12/28/2017 11:58 AM        Lab Results   Component Value Date/Time    ASTSGOT 20 12/24/2018 09:53 AM    ALTSGPT 15 12/24/2018 09:53 AM        Lab Results   Component Value Date/Time    CHOLSTRLTOT 179 12/24/2018 09:53 AM    LDL 88 12/24/2018 09:53 AM    HDL 76 12/24/2018 09:53 AM    TRIGLYCERIDE 75 12/24/2018 09:53 AM         No results found for this or any previous visit.    EKG, 1/8/2019, tracings and report reviewed, my interpretation: Sinus rhythm with a rate of 81 bpm, no other abnormalities    Assessment:     1. Sinus tachycardia  Holter Monitor Study    EKG       Medical Decision Making:  Today's Assessment / Status / Plan:     I do not suspect any cardiovascular pathology resulting in her tachycardia.  Her more rapid resting heart rate probably is a reflection of benzodiazepine withdrawal.  I did order a  24-hour Holter monitor to more precisely quantitate her average heart rate as her heart rate was normal in our clinic with a normal EKG.  I do not suspect that the diagnosis of inappropriate sinus tachycardia applies to her.  I also do not think she needs regular cardiology follow-up though of course I am happy to see her at any time as requested.    Vitaly Henry MD  Cardiologist, [facility] [place]     No Follow-up on file.

## 2019-01-08 NOTE — LETTER
[facility]   [address] [place] [address] [address]  S[place]IL[place] Pereira [address]  Phone: [phone]  Fax: [phone]              Valerie Alfredo  1965    Encounter Date: 1/8/2019    Vitaly Henry M.D.          PROGRESS NOTE:  Chief Complaint   Patient presents with   • Tachycardia       Subjective:   Valerie Alfredo is a 53 -year-old woman referred for evaluation of tachycardia.    She tells me today about diaphoresis and an increase in her resting heart rate detected on her wrist watch, which is a heart rate monitor.  She tells me that her average heart rate is somewhere around 100s beats per minute range, and describes to me previously exercising on a treadmill at 0% grade alternating walking and running at about 4.7 mph.  She tells me more recently she [place] with [place] heart rate of around 135 bpm though the details of her exact heart rate are not entirely clear to her as there is some data missing between the 2 types of exercise it seems.    She has no other cardiovascular complaints.  She is a lifetime non-smoker and a vegetarian.    Past Medical History:   Diagnosis Date   • Insomnia    • Migraine      Past Surgical History:   Procedure Laterality Date   • CATARACT EXTRACTION WITH IOL       Family History   Problem Relation Age of Onset   • Cancer Mother    • Other Father         scleroderma   • Hypertension Father    • Heart Disease Neg Hx      Social History     Social History   • Marital status:      Spouse name: N/A   • Number of children: N/A   • Years of education: N/A     Occupational History   • Not on file.     Social History Main Topics   • Smoking status: Never Smoker   • Smokeless tobacco: Never Used   • Alcohol use No   • Drug use: No   • Sexual activity: Yes     Partners: Male     Other Topics Concern   • Not on file     Social History Narrative   • No narrative on file     Allergies   Allergen Reactions   •  "Bactrim Swelling   • Sulfa Drugs Swelling     Outpatient Encounter Prescriptions as of 1/8/2019   Medication Sig Dispense Refill   • fluticasone (FLONASE) 50 MCG/ACT nasal spray Spray 2 Sprays in nose every day. Each Nostril 16 g 11   • PARoxetine (PAXIL) 20 MG Tab Take 1 Tab by mouth every day. 90 Tab 3   • amitriptyline (ELAVIL) 25 MG Tab Take 1 Tab by mouth at bedtime as needed for Sleep. 90 Tab 3   • topiramate (TOPAMAX) 50 MG tablet Take 1 Tab by mouth 2 times a day. 180 Tab 3   • sumatriptan (IMITREX) 100 MG tablet TAKE ONE TABLET BY MOUTH ONCE AS NEEDED FOR MIGRAINE FOR UP TO ONE DOSE 18 Tab 1   • HYDROmorphone (DILAUDID) 2 MG Tab Take 1-2 Tabs by mouth every 3 hours as needed for Severe Pain. 15 Tab 0   • promethazine (PHENERGAN) 25 MG Suppos Insert 1 Suppository in rectum every 6 hours as needed for Nausea/Vomiting. 10 Suppository 0   • promethazine (PHENERGAN) 25 MG Tab Take 1 Tab by mouth every 6 hours as needed for Nausea/Vomiting. 30 Tab 0   • clonazepam (KLONOPIN) 0.5 MG Tab TAKE ONE TABLET BY MOUTH TWICE DAILY AS NEEDED (Patient not taking: Reported on 1/8/2019) 60 Tab 2     No facility-administered encounter medications on file as of 1/8/2019.      Review of Systems   Constitutional: Positive for diaphoresis and malaise/fatigue.   HENT: Positive for congestion.    Respiratory: Positive for cough.    Endo/Heme/Allergies: Positive for environmental allergies and polydipsia.   Psychiatric/Behavioral: The patient is nervous/anxious.    All other systems reviewed and are negative.       Objective:   /78 (BP Location: Right arm, Patient Position: Sitting, BP Cuff Size: Adult)   Pulse (!) 110   Ht 1.626 m (5' 4\")   Wt 50 kg (110 lb 3.7 oz)   SpO2 98%   BMI 18.92 kg/m²      Physical Exam   Constitutional: She is oriented to person, place, and time. She appears well-developed and well-nourished. No distress.   Pleasant, in no distress   Eyes: Pupils are equal, round, and reactive to light. EOM are " normal. No scleral icterus.   Neck: No JVD present.   Cardiovascular: Normal rate, regular rhythm, normal heart sounds and intact distal pulses.  Exam reveals no gallop and no friction rub.    No murmur heard.  No carotid bruits   Pulmonary/Chest: Effort normal and breath sounds normal. No respiratory distress. She has no wheezes. She has no rales.   Abdominal: Soft. Bowel sounds are normal. She exhibits no distension.   Musculoskeletal: She exhibits no edema (No lower extremity edema bilaterally).   Neurological: She is alert and oriented to person, place, and time.   Skin: Skin is warm and dry. No rash noted. She is not diaphoretic. No erythema. No pallor.   Psychiatric: She has a normal mood and affect. Judgment and thought content normal.   Vitals reviewed.    Lab Results   Component Value Date/Time    WBC 5.9 12/24/2018 09:53 AM    RBC 4.39 12/24/2018 09:53 AM    HEMOGLOBIN 13.5 12/24/2018 09:53 AM    HEMATOCRIT 41.4 12/24/2018 09:53 AM    MCV 94.3 12/24/2018 09:53 AM    MCH 30.8 12/24/2018 09:53 AM    MCHC 32.6 (L) 12/24/2018 09:53 AM    MPV 10.3 12/24/2018 09:53 AM        Lab Results   Component Value Date/Time    SODIUM 138 12/24/2018 09:53 AM    POTASSIUM 3.8 12/24/2018 09:53 AM    CHLORIDE 105 12/24/2018 09:53 AM    CO2 25 12/24/2018 09:53 AM    GLUCOSE 86 12/24/2018 09:53 AM    BUN 18 12/24/2018 09:53 AM    CREATININE 0.86 12/24/2018 09:53 AM    BUNCREATRAT 15 12/28/2017 11:58 AM        Lab Results   Component Value Date/Time    ASTSGOT 20 12/24/2018 09:53 AM    ALTSGPT 15 12/24/2018 09:53 AM        Lab Results   Component Value Date/Time    CHOLSTRLTOT 179 12/24/2018 09:53 AM    LDL 88 12/24/2018 09:53 AM    HDL 76 12/24/2018 09:53 AM    TRIGLYCERIDE 75 12/24/2018 09:53 AM         No results found for this or any previous visit.    EKG, 1/8/2019, tracings and report reviewed, my interpretation: Sinus rhythm with a rate of 81 bpm, no other abnormalities    Assessment:     1. Sinus tachycardia  Holter  Monitor Study    EKG       Medical Decision Making:  Today's Assessment / Status / Plan:     I do not suspect any cardiovascular pathology resulting in her tachycardia.  Her more rapid resting heart rate probably is a reflection of benzodiazepine withdrawal.  I did order a 24-hour Holter monitor to more precisely quantitate her average heart rate as her heart rate was normal in our clinic with a normal EKG.  I do not suspect that the diagnosis of inappropriate sinus tachycardia applies to her.  I also do not think she needs regular cardiology follow-up though of course I am happy to see her at any time as requested.    Vitaly Henry MD  Cardiologist, [facility] [place]     No Follow-up on file.        Kayy Fabian M.D.  [facility]  [address] [place] [id number]  VIA In Basket

## 2019-01-16 ENCOUNTER — NON-PROVIDER VISIT (OUTPATIENT)
Dept: CARDIOLOGY | Facility: MEDICAL CENTER | Age: 54
End: 2019-01-16
Payer: COMMERCIAL

## 2019-01-16 DIAGNOSIS — R00.2 PALPITATIONS: ICD-10-CM

## 2019-01-16 PROCEDURE — 93224 XTRNL ECG REC UP TO 48 HRS: CPT | Performed by: INTERNAL MEDICINE

## 2019-01-18 ENCOUNTER — TELEPHONE (OUTPATIENT)
Dept: CARDIOLOGY | Facility: MEDICAL CENTER | Age: 54
End: 2019-01-18

## 2019-01-18 DIAGNOSIS — R00.0 SINUS TACHYCARDIA: ICD-10-CM

## 2019-01-18 LAB — EKG IMPRESSION: NORMAL

## 2019-01-18 NOTE — TELEPHONE ENCOUNTER
needs a holter order, IA ordered 24 hour holter, dx: sinus tachycardia   Yana Santamaria, Med Ass't  KESHA Flores     Holter Monitor ordered

## 2019-01-19 NOTE — TELEPHONE ENCOUNTER
Holter Monitor per Dr Henry:    Summary: no significant abnormality     Electronically Signed On 1- 16:24:02 PST by Vitaly Henry MD       Called pt and notified, pt verbalizes understanding

## 2019-02-14 DIAGNOSIS — Z01.84 IMMUNITY STATUS TESTING: ICD-10-CM

## 2019-02-21 DIAGNOSIS — G43.901 MIGRAINE WITH STATUS MIGRAINOSUS, NOT INTRACTABLE, UNSPECIFIED MIGRAINE TYPE: ICD-10-CM

## 2019-02-22 NOTE — TELEPHONE ENCOUNTER
Was the patient seen in the last year in this department? Yes    Does patient have an active prescription for medications requested? No     Received Request Via: Patient del      Patient is asking for a years of refills.

## 2019-02-23 NOTE — TELEPHONE ENCOUNTER
Was the patient seen in the last year in this department? Yes    Does patient have an active prescription for medications requested? No     Received Request Via: Pharmacy      Pt met protocol?: Yes    LAST OV 11/29/2018

## 2019-02-25 RX ORDER — SUMATRIPTAN 100 MG/1
TABLET, FILM COATED ORAL
Qty: 18 TAB | Refills: 3 | Status: SHIPPED | OUTPATIENT
Start: 2019-02-25 | End: 2020-06-02 | Stop reason: SDUPTHER

## 2019-02-27 ENCOUNTER — HOSPITAL ENCOUNTER (OUTPATIENT)
Dept: LAB | Facility: MEDICAL CENTER | Age: 54
End: 2019-02-27
Attending: FAMILY MEDICINE
Payer: COMMERCIAL

## 2019-02-27 DIAGNOSIS — E05.90 HYPERTHYROIDISM: ICD-10-CM

## 2019-02-27 DIAGNOSIS — Z01.84 IMMUNITY STATUS TESTING: ICD-10-CM

## 2019-02-27 LAB
MEV IGG SER IA-ACNC: 0.2
MUV IGG SER IA-ACNC: 0.33
RUBV AB SER QL: 38.2 IU/ML
T3 SERPL-MCNC: 81.3 NG/DL (ref 60–181)
T4 FREE SERPL-MCNC: 0.66 NG/DL (ref 0.53–1.43)
THYROPEROXIDASE AB SERPL-ACNC: 214.3 IU/ML (ref 0–9)
TSH SERPL DL<=0.005 MIU/L-ACNC: 3.54 UIU/ML (ref 0.38–5.33)

## 2019-02-27 PROCEDURE — 86376 MICROSOMAL ANTIBODY EACH: CPT

## 2019-02-27 PROCEDURE — 84439 ASSAY OF FREE THYROXINE: CPT

## 2019-02-27 PROCEDURE — 86800 THYROGLOBULIN ANTIBODY: CPT

## 2019-02-27 PROCEDURE — 86735 MUMPS ANTIBODY: CPT

## 2019-02-27 PROCEDURE — 86765 RUBEOLA ANTIBODY: CPT

## 2019-02-27 PROCEDURE — 86762 RUBELLA ANTIBODY: CPT

## 2019-02-27 PROCEDURE — 84480 ASSAY TRIIODOTHYRONINE (T3): CPT

## 2019-02-27 PROCEDURE — 36415 COLL VENOUS BLD VENIPUNCTURE: CPT

## 2019-02-27 PROCEDURE — 84443 ASSAY THYROID STIM HORMONE: CPT

## 2019-03-01 ENCOUNTER — TELEPHONE (OUTPATIENT)
Dept: MEDICAL GROUP | Facility: PHYSICIAN GROUP | Age: 54
End: 2019-03-01

## 2019-03-01 LAB — THYROGLOB AB SERPL-ACNC: 31.9 IU/ML (ref 0–4)

## 2019-03-01 NOTE — TELEPHONE ENCOUNTER
----- Message from Kayy Fabian M.D. sent at 2/28/2019 11:11 PM PST -----  Recommend patient has MMR vaccine.  Thyroid is good.

## 2019-03-05 ENCOUNTER — TELEPHONE (OUTPATIENT)
Dept: MEDICAL GROUP | Facility: PHYSICIAN GROUP | Age: 54
End: 2019-03-05

## 2019-03-05 DIAGNOSIS — Z23 NEED FOR VACCINATION: ICD-10-CM

## 2019-03-05 NOTE — TELEPHONE ENCOUNTER
1. Caller Name:                                          Call Back Number:       Patient approves a detailed voicemail message: N\A    Patient is on the MA Schedule tomorrow for mmr vaccine/injection.    SPECIFIC Action To Be Taken: Orders pending, please sign.

## 2019-03-06 ENCOUNTER — NON-PROVIDER VISIT (OUTPATIENT)
Dept: MEDICAL GROUP | Facility: PHYSICIAN GROUP | Age: 54
End: 2019-03-06
Payer: COMMERCIAL

## 2019-03-06 DIAGNOSIS — Z23 NEED FOR VACCINATION: ICD-10-CM

## 2019-09-05 ENCOUNTER — OFFICE VISIT (OUTPATIENT)
Dept: URGENT CARE | Facility: PHYSICIAN GROUP | Age: 54
End: 2019-09-05
Payer: COMMERCIAL

## 2019-09-05 VITALS
HEIGHT: 64 IN | BODY MASS INDEX: 20.66 KG/M2 | RESPIRATION RATE: 14 BRPM | HEART RATE: 89 BPM | WEIGHT: 121 LBS | DIASTOLIC BLOOD PRESSURE: 82 MMHG | SYSTOLIC BLOOD PRESSURE: 128 MMHG | OXYGEN SATURATION: 99 % | TEMPERATURE: 97.6 F
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
## 2019-09-05 DIAGNOSIS — S50.811A CAT SCRATCH OF FOREARM, RIGHT, INITIAL ENCOUNTER: Primary | ICD-10-CM

## 2019-09-05 PROCEDURE — 99214 OFFICE O/P EST MOD 30 MIN: CPT | Performed by: PHYSICIAN ASSISTANT

## 2019-09-05 RX ORDER — AMOXICILLIN AND CLAVULANATE POTASSIUM 875; 125 MG/1; MG/1
1 TABLET, FILM COATED ORAL 2 TIMES DAILY
Qty: 14 TAB | Refills: 0 | Status: SHIPPED | OUTPATIENT
Start: 2019-09-05 | End: 2019-09-12

## 2019-09-05 RX ORDER — AZITHROMYCIN 250 MG/1
TABLET, FILM COATED ORAL
Qty: 6 TAB | Refills: 0 | Status: SHIPPED | OUTPATIENT
Start: 2019-09-05 | End: 2019-12-17

## 2019-09-05 ASSESSMENT — ENCOUNTER SYMPTOMS
TINGLING: 0
HEADACHES: 0
COUGH: 0
DIARRHEA: 0
FOCAL WEAKNESS: 0
SHORTNESS OF BREATH: 0
FEVER: 0
ABDOMINAL PAIN: 0
NAUSEA: 0
CHILLS: 0
SENSORY CHANGE: 0
VOMITING: 0
MYALGIAS: 0

## 2019-09-05 NOTE — PROGRESS NOTES
"Subjective:      Valerie Sheppard is a 54 y.o. female who presents with Cat Scratch (L forearm, deep cut, pain in wrist xlast night)            Patient is here with complaints of cat scratch to left forearm and possible cat bite to left hand. The patient is fostering a mother cat she got from the [facility] who just had kittens. She states the cat attacked her last night (16 hours ago). The cat scratched her left forearm and possible but her left hand. The patient noticed some swelling in her left hand today. She denies fever or chills. The cat was a stray cat in [place]. She is unsure of rabies status. The cat did not show any active signs of rabies and will be returned to the [facility] for observation. TDAP is UTD      Past Medical History:   Diagnosis Date   • Insomnia    • Migraine        Past Surgical History:   Procedure Laterality Date   • CATARACT EXTRACTION WITH IOL         Family History   Problem Relation Age of Onset   • Cancer Mother    • Other Father         scleroderma   • Hypertension Father    • Heart Disease Neg Hx        Allergies   Allergen Reactions   • Bactrim Swelling   • Sulfa Drugs Swelling       Medications, Allergies, and current problem list reviewed today in Epic      Review of Systems   Constitutional: Negative for chills, fever and malaise/fatigue.   Respiratory: Negative for cough and shortness of breath.    Gastrointestinal: Negative for abdominal pain, diarrhea, nausea and vomiting.   Musculoskeletal: Negative for joint pain and myalgias.   Skin:        Scratch to left forearm. Possible bite to left hand with swelling   Neurological: Negative for tingling, sensory change, focal weakness and headaches.     All other systems reviewed and are negative.        Objective:     /82 (BP Location: Right arm, Patient Position: Sitting, BP Cuff Size: Adult)   Pulse 89   Temp 36.4 °C (97.6 °F) (Temporal)   Resp 14   Ht 1.626 m (5' 4\")   Wt 54.9 kg (121 " lb)   SpO2 99%   BMI 20.77 kg/m²      Physical Exam   Constitutional: She is oriented to person, place, and time. She appears well-developed and well-nourished. No distress.   HENT:   Head: Normocephalic and atraumatic.   Cardiovascular: Normal rate.   Pulmonary/Chest: Effort normal. No respiratory distress.   Neurological: She is alert and oriented to person, place, and time. No cranial nerve deficit.   Skin:        Psychiatric: She has a normal mood and affect. Her behavior is normal. Judgment and thought content normal.               Assessment/Plan:     1. Cat scratch of forearm, right, initial encounter    Will treat with Z-elyse and Augmentin for cat scratch and possible bite.    - azithromycin (ZITHROMAX) 250 MG Tab; Take as directed on package. 1 pack.  Dispense: 6 Tab; Refill: 0  - mupirocin (BACTROBAN) 2 % Ointment; Apply 1 Application to affected area(s) 2 times a day.  Dispense: 1 Tube; Refill: 0  - amoxicillin-clavulanate (AUGMENTIN) 875-125 MG Tab; Take 1 Tab by mouth 2 times a day for 7 days.  Dispense: 14 Tab; Refill: 0      Wound care discussed.  Likelihood of rabies is low considering the cat was from the [employer] and cat did not demonstrate any rabid symptoms. Cat was from [place].     Differential diagnoses, Supportive care, and indications for immediate follow-up discussed with patient.   Instructed to return to clinic or nearest emergency department for any change in condition, further concerns, or worsening of symptoms.    The patient demonstrated a good understanding and agreed with the treatment plan.    Dia Lyn P.A.-C.

## 2019-11-18 ENCOUNTER — HOSPITAL ENCOUNTER (OUTPATIENT)
Dept: RADIOLOGY | Facility: MEDICAL CENTER | Age: 54
End: 2019-11-18
Attending: FAMILY MEDICINE
Payer: COMMERCIAL

## 2019-11-18 DIAGNOSIS — Z12.31 VISIT FOR SCREENING MAMMOGRAM: ICD-10-CM

## 2019-11-18 PROCEDURE — 77063 BREAST TOMOSYNTHESIS BI: CPT

## 2019-11-29 DIAGNOSIS — G43.909 MIGRAINE WITHOUT STATUS MIGRAINOSUS, NOT INTRACTABLE, UNSPECIFIED MIGRAINE TYPE: ICD-10-CM

## 2019-11-29 DIAGNOSIS — G47.00 INSOMNIA, UNSPECIFIED TYPE: ICD-10-CM

## 2019-11-29 DIAGNOSIS — F41.9 ANXIETY: ICD-10-CM

## 2019-12-02 RX ORDER — AMITRIPTYLINE HYDROCHLORIDE 25 MG/1
25 TABLET, FILM COATED ORAL NIGHTLY PRN
Qty: 90 TAB | Refills: 0 | Status: SHIPPED | OUTPATIENT
Start: 2019-12-02 | End: 2020-03-03 | Stop reason: SDUPTHER

## 2019-12-02 RX ORDER — PAROXETINE HYDROCHLORIDE 20 MG/1
TABLET, FILM COATED ORAL
Qty: 90 TAB | Refills: 0 | Status: SHIPPED | OUTPATIENT
Start: 2019-12-02 | End: 2020-03-30

## 2019-12-02 NOTE — TELEPHONE ENCOUNTER
Was the patient seen in the last year in this department? No     Does patient have an active prescription for medications requested? No     Received Request Via: Pharmacy      Pt met protocol?: No    *PT HAS UPCOMING APPT 12/17

## 2019-12-11 RX ORDER — TOPIRAMATE 50 MG/1
TABLET, FILM COATED ORAL
Qty: 180 TAB | Refills: 0 | Status: SHIPPED | OUTPATIENT
Start: 2019-12-11 | End: 2020-03-30

## 2019-12-11 NOTE — TELEPHONE ENCOUNTER
Has upcoming appt w/ PCP. Will send 3 months of fills to pharmacy.  
Was the patient seen in the last year in this department? No     Does patient have an active prescription for medications requested? No     Received Request Via: Pharmacy      Pt met protocol?: No   Last ov 11/18 has an upcoming appt 12/17/19  
Yes

## 2019-12-17 ENCOUNTER — OFFICE VISIT (OUTPATIENT)
Dept: MEDICAL GROUP | Facility: PHYSICIAN GROUP | Age: 54
End: 2019-12-17
Payer: COMMERCIAL

## 2019-12-17 VITALS
TEMPERATURE: 98.2 F | SYSTOLIC BLOOD PRESSURE: 112 MMHG | BODY MASS INDEX: 19.84 KG/M2 | DIASTOLIC BLOOD PRESSURE: 68 MMHG | HEART RATE: 82 BPM | OXYGEN SATURATION: 98 % | WEIGHT: 116.2 LBS | RESPIRATION RATE: 12 BRPM | HEIGHT: 64 IN

## 2019-12-17 DIAGNOSIS — Z13.6 SCREENING FOR CARDIOVASCULAR CONDITION: ICD-10-CM

## 2019-12-17 DIAGNOSIS — H02.402 PTOSIS OF LEFT EYELID: ICD-10-CM

## 2019-12-17 DIAGNOSIS — Z00.00 WELL ADULT EXAM: ICD-10-CM

## 2019-12-17 DIAGNOSIS — Z13.29 SCREENING FOR ENDOCRINE DISORDER: ICD-10-CM

## 2019-12-17 DIAGNOSIS — Z13.0 SCREENING FOR DEFICIENCY ANEMIA: ICD-10-CM

## 2019-12-17 PROCEDURE — 99396 PREV VISIT EST AGE 40-64: CPT | Performed by: FAMILY MEDICINE

## 2019-12-17 ASSESSMENT — PATIENT HEALTH QUESTIONNAIRE - PHQ9: CLINICAL INTERPRETATION OF PHQ2 SCORE: 0

## 2019-12-17 NOTE — PROGRESS NOTES
Chief Complaint   Patient presents with   • Annual Exam       HISTORY OF PRESENT ILLNESS: Patient is a 54 y.o. female est patient who presents today to discuss the following issues:    1. Well adult exam  Here for annual wellness visit.  Having some fatigue, but also very bothered by the left lid ptosis that is now affecting vision and feels could be contributing to some of the fatigue.    Pap with gyn.  Up to date on mammogram.     Active Ambulatory Problems     Diagnosis Date Noted   • Migraines 10/19/2012   • Insomnia 10/19/2012   • Menopausal symptoms 10/19/2012   • Allergic rhinitis 10/19/2012   • Foot pain, left 10/19/2012     Resolved Ambulatory Problems     Diagnosis Date Noted   • Anxiety 10/19/2012     Past Medical History:   Diagnosis Date   • Migraine        Allergies:Bactrim and Sulfa drugs    Current Outpatient Medications   Medication Sig Dispense Refill   • topiramate (TOPAMAX) 50 MG tablet TAKE 1 TABLET BY MOUTH TWICE A  Tab 0   • PARoxetine (PAXIL) 20 MG Tab TAKE 1 TABLET BY MOUTH EVERY DAY 90 Tab 0   • amitriptyline (ELAVIL) 25 MG Tab TAKE 1 TAB BY MOUTH AT BEDTIME AS NEEDED FOR SLEEP. 90 Tab 0   • fluticasone (FLONASE) 50 MCG/ACT nasal spray SPRAY 2 SPRAYS IN EACH NOSTRIL EVERY DAY 3 Bottle 0   • mupirocin (BACTROBAN) 2 % Ointment Apply 1 Application to affected area(s) 2 times a day. 1 Tube 0   • sumatriptan (IMITREX) 100 MG tablet TAKE ONE TABLET BY MOUTH ONCE AS NEEDED FOR MIGRAINE FOR UP TO ONE DOSE 18 Tab 3   • HYDROmorphone (DILAUDID) 2 MG Tab Take 1-2 Tabs by mouth every 3 hours as needed for Severe Pain. 15 Tab 0   • promethazine (PHENERGAN) 25 MG Suppos Insert 1 Suppository in rectum every 6 hours as needed for Nausea/Vomiting. 10 Suppository 0   • promethazine (PHENERGAN) 25 MG Tab Take 1 Tab by mouth every 6 hours as needed for Nausea/Vomiting. 30 Tab 0     No current facility-administered medications for this visit.        Social History     Tobacco Use   • Smoking status:  "Never Smoker   • Smokeless tobacco: Never Used   Substance Use Topics   • Alcohol use: No     Alcohol/week: 0.0 oz     Comment: socially   • Drug use: No       Family Status   Relation Name Status   • Mo     • Fa     • Neg Hx  (Not Specified)     Family History   Problem Relation Age of Onset   • Cancer Mother    • Other Father         scleroderma   • Hypertension Father    • Heart Disease Neg Hx      Health Maintenance Due   Topic Date Due   • PAP SMEAR  2019       ROS:  Review of Systems   Constitutional: Negative for fever, chills, weight loss and malaise/fatigue.   HENT: Negative for ear pain, nosebleeds, congestion, sore throat and neck pain.    Eyes: Negative for blurred vision.   Respiratory: Negative for cough, sputum production, shortness of breath and wheezing.    Cardiovascular: Negative for chest pain, palpitations, orthopnea and leg swelling.   Gastrointestinal: Negative for heartburn, nausea, vomiting and abdominal pain.   Genitourinary: Negative for dysuria, urgency and frequency.   Musculoskeletal: Negative for myalgias, back pain and joint pain.   Skin: Negative for rash and itching.   Neurological: Negative for dizziness, tingling, tremors, sensory change, focal weakness and headaches.   Endo/Heme/Allergies: Does not bruise/bleed easily.   Psychiatric/Behavioral: Negative for depression, suicidal ideas and memory loss.  The patient is not nervous/anxious and does not have insomnia.      Exam:  /68 (BP Location: Right arm, Patient Position: Sitting, BP Cuff Size: Adult)   Pulse 82   Temp 36.8 °C (98.2 °F)   Resp 12   Ht 1.626 m (5' 4\")   Wt 52.7 kg (116 lb 3.2 oz)   SpO2 98%   General:  Well nourished, well developed female in NAD  HEENT: Normocephalic and atraumatic. TMs clear bilaterally. Oropharynx is clear      without erythema and no tonsillar exudate. Nasal mucosa pink with minimal      Rhinorrhea.  EYES: EOMI.  Conjunctiva clear.    Neck: Supple without JVD " or bruit. Thyroid is not enlarged.  Pulmonary: Clear to ausculation and percussion.  Normal effort. No rales, ronchi, or wheezing.  Cardiovascular: Regular rate and rhythm without murmur, no peripheral edema  Abdomen: positive bowel sounds.  Non tender, non distended, no hepatosplenomegaly.   MSK: normal ROM in upper and lower extremities bilaterally  Psych: appropriate affect and concentration, oriented to person and place  Neuro: no unilateral weakness in upper or lower extremities.  No gait disturbance    Please note that this dictation was created using voice recognition software. I have made every reasonable attempt to correct obvious errors, but I expect that there are errors of grammar and possibly content that I did not discover before finalizing the note.    Assessment/Plan:  1. Well adult exam    - Comp Metabolic Panel; Future  - Lipid Profile; Future  - TSH; Standing  - TRIIDOTHYRONINE; Standing  - FREE THYROXINE; Standing  - CBC WITH DIFFERENTIAL; Future  - VITAMIN D,25 HYDROXY; Future    2. Screening for deficiency anemia  - CBC WITH DIFFERENTIAL; Future    3. Screening for endocrine disorder  - TSH; Standing  - TRIIDOTHYRONINE; Standing  - FREE THYROXINE; Standing  - VITAMIN D,25 HYDROXY; Future    4. Screening for cardiovascular condition  - Comp Metabolic Panel; Future  - Lipid Profile; Future    5. Ptosis of left eyelid  - REFERRAL TO PLASTIC SURGERY      Follow up 6 months on migraines.

## 2019-12-24 ENCOUNTER — HOSPITAL ENCOUNTER (OUTPATIENT)
Dept: LAB | Facility: MEDICAL CENTER | Age: 54
End: 2019-12-24
Attending: FAMILY MEDICINE
Payer: COMMERCIAL

## 2019-12-24 DIAGNOSIS — Z00.00 WELL ADULT EXAM: ICD-10-CM

## 2019-12-24 DIAGNOSIS — Z13.0 SCREENING FOR DEFICIENCY ANEMIA: ICD-10-CM

## 2019-12-24 DIAGNOSIS — Z13.29 SCREENING FOR ENDOCRINE DISORDER: ICD-10-CM

## 2019-12-24 DIAGNOSIS — Z13.6 SCREENING FOR CARDIOVASCULAR CONDITION: ICD-10-CM

## 2019-12-24 LAB
25(OH)D3 SERPL-MCNC: 62 NG/ML (ref 30–100)
ALBUMIN SERPL BCP-MCNC: 4.5 G/DL (ref 3.2–4.9)
ALBUMIN/GLOB SERPL: 1.9 G/DL
ALP SERPL-CCNC: 59 U/L (ref 30–99)
ALT SERPL-CCNC: 14 U/L (ref 2–50)
ANION GAP SERPL CALC-SCNC: 7 MMOL/L (ref 0–11.9)
AST SERPL-CCNC: 22 U/L (ref 12–45)
BASOPHILS # BLD AUTO: 1.5 % (ref 0–1.8)
BASOPHILS # BLD: 0.09 K/UL (ref 0–0.12)
BILIRUB SERPL-MCNC: 0.3 MG/DL (ref 0.1–1.5)
BUN SERPL-MCNC: 15 MG/DL (ref 8–22)
CALCIUM SERPL-MCNC: 9.4 MG/DL (ref 8.5–10.5)
CHLORIDE SERPL-SCNC: 105 MMOL/L (ref 96–112)
CHOLEST SERPL-MCNC: 199 MG/DL (ref 100–199)
CO2 SERPL-SCNC: 26 MMOL/L (ref 20–33)
CREAT SERPL-MCNC: 0.9 MG/DL (ref 0.5–1.4)
EOSINOPHIL # BLD AUTO: 0.43 K/UL (ref 0–0.51)
EOSINOPHIL NFR BLD: 7.3 % (ref 0–6.9)
ERYTHROCYTE [DISTWIDTH] IN BLOOD BY AUTOMATED COUNT: 47 FL (ref 35.9–50)
FASTING STATUS PATIENT QL REPORTED: NORMAL
GLOBULIN SER CALC-MCNC: 2.4 G/DL (ref 1.9–3.5)
GLUCOSE SERPL-MCNC: 87 MG/DL (ref 65–99)
HCT VFR BLD AUTO: 43.9 % (ref 37–47)
HDLC SERPL-MCNC: 96 MG/DL
HGB BLD-MCNC: 13.9 G/DL (ref 12–16)
IMM GRANULOCYTES # BLD AUTO: 0.01 K/UL (ref 0–0.11)
IMM GRANULOCYTES NFR BLD AUTO: 0.2 % (ref 0–0.9)
LDLC SERPL CALC-MCNC: 94 MG/DL
LYMPHOCYTES # BLD AUTO: 1.71 K/UL (ref 1–4.8)
LYMPHOCYTES NFR BLD: 29.2 % (ref 22–41)
MCH RBC QN AUTO: 31.3 PG (ref 27–33)
MCHC RBC AUTO-ENTMCNC: 31.7 G/DL (ref 33.6–35)
MCV RBC AUTO: 98.9 FL (ref 81.4–97.8)
MONOCYTES # BLD AUTO: 0.4 K/UL (ref 0–0.85)
MONOCYTES NFR BLD AUTO: 6.8 % (ref 0–13.4)
NEUTROPHILS # BLD AUTO: 3.22 K/UL (ref 2–7.15)
NEUTROPHILS NFR BLD: 55 % (ref 44–72)
NRBC # BLD AUTO: 0 K/UL
NRBC BLD-RTO: 0 /100 WBC
PLATELET # BLD AUTO: 310 K/UL (ref 164–446)
PMV BLD AUTO: 11.3 FL (ref 9–12.9)
POTASSIUM SERPL-SCNC: 3.9 MMOL/L (ref 3.6–5.5)
PROT SERPL-MCNC: 6.9 G/DL (ref 6–8.2)
RBC # BLD AUTO: 4.44 M/UL (ref 4.2–5.4)
SODIUM SERPL-SCNC: 138 MMOL/L (ref 135–145)
T3 SERPL-MCNC: 110.5 NG/DL (ref 60–181)
T4 FREE SERPL-MCNC: 0.9 NG/DL (ref 0.53–1.43)
TRIGL SERPL-MCNC: 45 MG/DL (ref 0–149)
TSH SERPL DL<=0.005 MIU/L-ACNC: 2.25 UIU/ML (ref 0.38–5.33)
WBC # BLD AUTO: 5.9 K/UL (ref 4.8–10.8)

## 2019-12-24 PROCEDURE — 36415 COLL VENOUS BLD VENIPUNCTURE: CPT

## 2019-12-24 PROCEDURE — 85025 COMPLETE CBC W/AUTO DIFF WBC: CPT

## 2019-12-24 PROCEDURE — 84439 ASSAY OF FREE THYROXINE: CPT

## 2019-12-24 PROCEDURE — 82306 VITAMIN D 25 HYDROXY: CPT

## 2019-12-24 PROCEDURE — 80061 LIPID PANEL: CPT

## 2019-12-24 PROCEDURE — 84480 ASSAY TRIIODOTHYRONINE (T3): CPT

## 2019-12-24 PROCEDURE — 80053 COMPREHEN METABOLIC PANEL: CPT

## 2019-12-24 PROCEDURE — 84443 ASSAY THYROID STIM HORMONE: CPT

## 2020-03-02 DIAGNOSIS — G43.909 MIGRAINE WITHOUT STATUS MIGRAINOSUS, NOT INTRACTABLE, UNSPECIFIED MIGRAINE TYPE: ICD-10-CM

## 2020-03-02 DIAGNOSIS — G47.00 INSOMNIA, UNSPECIFIED TYPE: ICD-10-CM

## 2020-03-02 NOTE — TELEPHONE ENCOUNTER
Was the patient seen in the last year in this department? Yes    Does patient have an active prescription for medications requested? No     Received Request Via: Pharmacy      Pt met protocol?: Yes   Pt last ov 12/2019

## 2020-03-03 RX ORDER — AMITRIPTYLINE HYDROCHLORIDE 25 MG/1
25 TABLET, FILM COATED ORAL NIGHTLY PRN
Qty: 90 TAB | Refills: 1 | Status: SHIPPED | OUTPATIENT
Start: 2020-03-03 | End: 2020-06-02 | Stop reason: SDUPTHER

## 2020-03-28 DIAGNOSIS — F41.9 ANXIETY: ICD-10-CM

## 2020-03-30 RX ORDER — TOPIRAMATE 50 MG/1
TABLET, FILM COATED ORAL
Qty: 180 TAB | Refills: 1 | Status: SHIPPED | OUTPATIENT
Start: 2020-03-30 | End: 2020-06-02 | Stop reason: SDUPTHER

## 2020-03-30 RX ORDER — PAROXETINE HYDROCHLORIDE 20 MG/1
TABLET, FILM COATED ORAL
Qty: 90 TAB | Refills: 1 | Status: SHIPPED | OUTPATIENT
Start: 2020-03-30 | End: 2020-06-02 | Stop reason: SDUPTHER

## 2020-03-30 NOTE — TELEPHONE ENCOUNTER
Was the patient seen in the last year in this department? Yes    Does patient have an active prescription for medications requested? No     Received Request Via: Pharmacy      Pt met protocol?: Yes   Last ov 12/2019   Lab Results   Component Value Date/Time    SODIUM 138 12/24/2019 10:59 AM    POTASSIUM 3.9 12/24/2019 10:59 AM    CHLORIDE 105 12/24/2019 10:59 AM    CO2 26 12/24/2019 10:59 AM    GLUCOSE 87 12/24/2019 10:59 AM    BUN 15 12/24/2019 10:59 AM    CREATININE 0.90 12/24/2019 10:59 AM    BUNCREATRAT 15 12/28/2017 11:58 AM

## 2020-06-02 ENCOUNTER — PATIENT MESSAGE (OUTPATIENT)
Dept: MEDICAL GROUP | Facility: PHYSICIAN GROUP | Age: 55
End: 2020-06-02

## 2020-06-02 DIAGNOSIS — F41.9 ANXIETY: ICD-10-CM

## 2020-06-02 DIAGNOSIS — G47.00 INSOMNIA, UNSPECIFIED TYPE: ICD-10-CM

## 2020-06-02 DIAGNOSIS — G43.901 MIGRAINE WITH STATUS MIGRAINOSUS, NOT INTRACTABLE, UNSPECIFIED MIGRAINE TYPE: ICD-10-CM

## 2020-06-02 DIAGNOSIS — G43.909 MIGRAINE WITHOUT STATUS MIGRAINOSUS, NOT INTRACTABLE, UNSPECIFIED MIGRAINE TYPE: ICD-10-CM

## 2020-06-04 RX ORDER — TOPIRAMATE 50 MG/1
50 TABLET, FILM COATED ORAL 2 TIMES DAILY
Qty: 180 TAB | Refills: 1 | Status: SHIPPED | OUTPATIENT
Start: 2020-06-04 | End: 2020-11-18

## 2020-06-04 RX ORDER — PAROXETINE HYDROCHLORIDE 20 MG/1
20 TABLET, FILM COATED ORAL
Qty: 90 TAB | Refills: 1 | Status: SHIPPED | OUTPATIENT
Start: 2020-06-04 | End: 2020-11-18

## 2020-06-04 RX ORDER — AMITRIPTYLINE HYDROCHLORIDE 25 MG/1
25 TABLET, FILM COATED ORAL NIGHTLY PRN
Qty: 90 TAB | Refills: 1 | Status: SHIPPED | OUTPATIENT
Start: 2020-06-04 | End: 2020-11-18

## 2020-06-04 RX ORDER — SUMATRIPTAN 100 MG/1
100 TABLET, FILM COATED ORAL
Qty: 18 TAB | Refills: 3 | Status: SHIPPED | OUTPATIENT
Start: 2020-06-04 | End: 2020-12-18 | Stop reason: SDUPTHER

## 2020-11-16 DIAGNOSIS — G43.909 MIGRAINE WITHOUT STATUS MIGRAINOSUS, NOT INTRACTABLE, UNSPECIFIED MIGRAINE TYPE: ICD-10-CM

## 2020-11-16 DIAGNOSIS — F41.9 ANXIETY: ICD-10-CM

## 2020-11-16 DIAGNOSIS — G47.00 INSOMNIA, UNSPECIFIED TYPE: ICD-10-CM

## 2020-11-18 RX ORDER — PAROXETINE HYDROCHLORIDE 20 MG/1
20 TABLET, FILM COATED ORAL DAILY
Qty: 90 TAB | Refills: 0 | Status: SHIPPED | OUTPATIENT
Start: 2020-11-18 | End: 2020-12-18 | Stop reason: SDUPTHER

## 2020-11-18 RX ORDER — AMITRIPTYLINE HYDROCHLORIDE 25 MG/1
25 TABLET, FILM COATED ORAL NIGHTLY PRN
Qty: 90 TAB | Refills: 0 | Status: SHIPPED | OUTPATIENT
Start: 2020-11-18 | End: 2020-12-18 | Stop reason: SDUPTHER

## 2020-11-18 RX ORDER — TOPIRAMATE 50 MG/1
TABLET, FILM COATED ORAL
Qty: 180 TAB | Refills: 0 | Status: SHIPPED | OUTPATIENT
Start: 2020-11-18 | End: 2020-12-18 | Stop reason: SDUPTHER

## 2020-11-18 NOTE — TELEPHONE ENCOUNTER
Last seen by PCP 12/17/2019. Will send 3 month(s) to the pharmacy. Pt to make appt prior to more refills.

## 2020-11-29 ENCOUNTER — PATIENT MESSAGE (OUTPATIENT)
Dept: MEDICAL GROUP | Facility: PHYSICIAN GROUP | Age: 55
End: 2020-11-29

## 2020-11-30 ENCOUNTER — TELEPHONE (OUTPATIENT)
Dept: MEDICAL GROUP | Facility: PHYSICIAN GROUP | Age: 55
End: 2020-11-30

## 2020-11-30 NOTE — TELEPHONE ENCOUNTER
----- Message from Valerie Sheppard sent at 11/29/2020 10:50 AM PST -----  Regarding: Non-Urgent Medical Question  Contact: [phone]  I am applying for a new health insurance plan/new company. If you can, I need a letter from you stating that my prescription for amitriptyline is NOT for depression but for migraines and that my prescription for paroxetine is NOT for migraines but for hot flashes.  Thank you so much.  Valerie Alfredo

## 2020-12-18 ENCOUNTER — TELEMEDICINE (OUTPATIENT)
Dept: MEDICAL GROUP | Facility: PHYSICIAN GROUP | Age: 55
End: 2020-12-18
Payer: COMMERCIAL

## 2020-12-18 VITALS — BODY MASS INDEX: 19.4 KG/M2 | WEIGHT: 113 LBS

## 2020-12-18 DIAGNOSIS — H02.824 CYST OF LEFT UPPER EYELID: ICD-10-CM

## 2020-12-18 DIAGNOSIS — J30.9 ALLERGIC RHINITIS, UNSPECIFIED SEASONALITY, UNSPECIFIED TRIGGER: ICD-10-CM

## 2020-12-18 DIAGNOSIS — N95.1 MENOPAUSAL SYMPTOMS: ICD-10-CM

## 2020-12-18 DIAGNOSIS — Z12.31 ENCOUNTER FOR SCREENING MAMMOGRAM FOR MALIGNANT NEOPLASM OF BREAST: ICD-10-CM

## 2020-12-18 DIAGNOSIS — G47.00 INSOMNIA, UNSPECIFIED TYPE: ICD-10-CM

## 2020-12-18 DIAGNOSIS — G43.909 MIGRAINE WITHOUT STATUS MIGRAINOSUS, NOT INTRACTABLE, UNSPECIFIED MIGRAINE TYPE: ICD-10-CM

## 2020-12-18 DIAGNOSIS — G43.901 MIGRAINE WITH STATUS MIGRAINOSUS, NOT INTRACTABLE, UNSPECIFIED MIGRAINE TYPE: ICD-10-CM

## 2020-12-18 DIAGNOSIS — F41.9 ANXIETY: ICD-10-CM

## 2020-12-18 PROCEDURE — 99396 PREV VISIT EST AGE 40-64: CPT | Mod: 95,CR | Performed by: FAMILY MEDICINE

## 2020-12-18 RX ORDER — SUMATRIPTAN 100 MG/1
100 TABLET, FILM COATED ORAL
Qty: 18 TAB | Refills: 3 | Status: SHIPPED | OUTPATIENT
Start: 2020-12-18

## 2020-12-18 RX ORDER — AMITRIPTYLINE HYDROCHLORIDE 25 MG/1
25 TABLET, FILM COATED ORAL NIGHTLY PRN
Qty: 90 TAB | Refills: 3 | Status: SHIPPED | OUTPATIENT
Start: 2020-12-18

## 2020-12-18 RX ORDER — PAROXETINE HYDROCHLORIDE 20 MG/1
20 TABLET, FILM COATED ORAL DAILY
Qty: 90 TAB | Refills: 3 | Status: SHIPPED | OUTPATIENT
Start: 2020-12-18

## 2020-12-18 RX ORDER — FLUTICASONE PROPIONATE 50 MCG
2 SPRAY, SUSPENSION (ML) NASAL
Qty: 16 G | Refills: 3 | Status: SHIPPED | OUTPATIENT
Start: 2020-12-18

## 2020-12-18 RX ORDER — TOPIRAMATE 50 MG/1
TABLET, FILM COATED ORAL
Qty: 180 TAB | Refills: 3 | Status: SHIPPED | OUTPATIENT
Start: 2020-12-18

## 2020-12-18 ASSESSMENT — PATIENT HEALTH QUESTIONNAIRE - PHQ9: CLINICAL INTERPRETATION OF PHQ2 SCORE: 0

## 2020-12-18 ASSESSMENT — FIBROSIS 4 INDEX: FIB4 SCORE: 1.04

## 2020-12-18 NOTE — PROGRESS NOTES
Chief Complaint   Patient presents with   • Annual Exam       HISTORY OF PRESENT ILLNESS: Patient is a 55 y.o. female established patient here today for the following concerns:    This encounter was conducted via Zoom .   Verbal consent was obtained. Patient's identity was verified.      1. Menopausal symptoms  Continues on the paxil.  Having less hot flashes, wondering if she can reduce the dose.    2. Migraine without status migrainosus, not intractable, unspecified migraine type  Reports as she has gotten further into menopause the migraines are improving.  She reports less now, but still flares with the storms.  SHe is wondering if it would be ok to start reducing Elavil.  Still on Topamax and Imitrex for rescue.     3. Cyst of left upper eyelid  Has surgery with Dr. Resendiz scheduled for 1/11/21    4. Encounter for screening mammogram for malignant neoplasm of breast  due      Past Medical, Social, and Family history reviewed and updated in EPIC    Allergies:Bactrim and Sulfa drugs    Current Outpatient Medications   Medication Sig Dispense Refill   • amitriptyline (ELAVIL) 25 MG Tab Take 1 Tab by mouth at bedtime as needed. Pt to make appt prior to more refills. 90 Tab 0   • PARoxetine (PAXIL) 20 MG Tab Take 1 Tab by mouth every day. Pt to make appt prior to more refills. 90 Tab 0   • topiramate (TOPAMAX) 50 MG tablet TAKE ONE TABLET BY MOUTH TWICE DAILY  180 Tab 0   • sumatriptan (IMITREX) 100 MG tablet Take 1 Tab by mouth Once PRN for Migraine for up to 1 dose. 18 Tab 3   • fluticasone (FLONASE) 50 MCG/ACT nasal spray SPRAY 2 SPRAYS IN EACH NOSTRIL EVERY DAY 3 Bottle 0   • HYDROmorphone (DILAUDID) 2 MG Tab Take 1-2 Tabs by mouth every 3 hours as needed for Severe Pain. 15 Tab 0   • promethazine (PHENERGAN) 25 MG Suppos Insert 1 Suppository in rectum every 6 hours as needed for Nausea/Vomiting. 10 Suppository 0     No current facility-administered medications for this visit.          ROS:  Review of Systems    Constitutional: Negative for fever, chills, weight loss and malaise/fatigue.   HENT: Negative for ear pain, nosebleeds, congestion, sore throat and neck pain.    Eyes: Negative for blurred vision.   Respiratory: Negative for cough, sputum production, shortness of breath and wheezing.    Cardiovascular: Negative for chest pain, palpitations,  and leg swelling.   Gastrointestinal: Negative for heartburn, nausea, vomiting, diarrhea and abdominal pain.   Genitourinary: Negative for dysuria, urgency and frequency.   Musculoskeletal: Negative for myalgias, back pain and joint pain.   Skin: Negative for rash and itching.   Neurological: Negative for dizziness, tingling, tremors, sensory change, focal weakness and headaches.   Endo/Heme/Allergies: Does not bruise/bleed easily.   Psychiatric/Behavioral: Negative for depression, anxiety, suicidal ideas, insomnia and memory loss.      Exam:  Wt 51.3 kg (113 lb)     General:  Well nourished, well developed in NAD  Head is grossly normal.  Neck: Supple without JVD, Trachea midline, no thyromegaly noted  Pulmonary:  Normal effort. Speaking in full sentences  Psych: affect appropriate  Skin: no Jaundice noted or facial rashes    Please note that this dictation was created using voice recognition software. I have made every reasonable attempt to correct obvious errors, but I expect that there are errors of grammar and possibly content that I did not discover before finalizing the note.    Assessment/Plan:  1. Menopausal symptoms     Will decrease to 10 mg for 1 month and may d/c or return to 20 pending symptoms. Renewal sent today     2. Migraine without status migrainosus, not intractable, unspecified migraine type  Renewal sent.   Ok to try to taper off TCA     3. Cyst of left upper eyelid  Has surgery scheduled for 1/11/21 with Dr. Resendiz.      4. Encounter for screening mammogram for malignant neoplasm of breast  Due for mammogram no new lumps.   - MA-SCREENING MAMMO BILAT  W/TOMOSYNTHESIS W/CAD; Future    Transitional care

## 2021-01-05 ENCOUNTER — HOSPITAL ENCOUNTER (OUTPATIENT)
Dept: LAB | Facility: MEDICAL CENTER | Age: 56
End: 2021-01-05
Attending: FAMILY MEDICINE
Payer: COMMERCIAL

## 2021-01-05 LAB
ANION GAP SERPL CALC-SCNC: 7 MMOL/L (ref 7–16)
BUN SERPL-MCNC: 14 MG/DL (ref 8–22)
CALCIUM SERPL-MCNC: 9.1 MG/DL (ref 8.5–10.5)
CHLORIDE SERPL-SCNC: 106 MMOL/L (ref 96–112)
CO2 SERPL-SCNC: 25 MMOL/L (ref 20–33)
CREAT SERPL-MCNC: 0.77 MG/DL (ref 0.5–1.4)
GLUCOSE SERPL-MCNC: 108 MG/DL (ref 65–99)
POTASSIUM SERPL-SCNC: 3.9 MMOL/L (ref 3.6–5.5)
SODIUM SERPL-SCNC: 138 MMOL/L (ref 135–145)

## 2021-01-05 PROCEDURE — 36415 COLL VENOUS BLD VENIPUNCTURE: CPT

## 2021-01-05 PROCEDURE — 80048 BASIC METABOLIC PNL TOTAL CA: CPT

## 2021-01-08 ENCOUNTER — HOSPITAL ENCOUNTER (OUTPATIENT)
Dept: LAB | Facility: MEDICAL CENTER | Age: 56
End: 2021-01-08
Attending: OPHTHALMOLOGY
Payer: COMMERCIAL

## 2021-01-08 PROCEDURE — U0003 INFECTIOUS AGENT DETECTION BY NUCLEIC ACID (DNA OR RNA); SEVERE ACUTE RESPIRATORY SYNDROME CORONAVIRUS 2 (SARS-COV-2) (CORONAVIRUS DISEASE [COVID-19]), AMPLIFIED PROBE TECHNIQUE, MAKING USE OF HIGH THROUGHPUT TECHNOLOGIES AS DESCRIBED BY CMS-2020-01-R: HCPCS

## 2021-01-08 PROCEDURE — U0005 INFEC AGEN DETEC AMPLI PROBE: HCPCS

## 2021-01-08 PROCEDURE — C9803 HOPD COVID-19 SPEC COLLECT: HCPCS

## 2021-01-09 LAB
COVID ORDER STATUS COVID19: NORMAL
SARS-COV-2 RNA RESP QL NAA+PROBE: NOTDETECTED
SPECIMEN SOURCE: NORMAL

## 2021-01-20 ENCOUNTER — HOSPITAL ENCOUNTER (OUTPATIENT)
Dept: RADIOLOGY | Facility: MEDICAL CENTER | Age: 56
End: 2021-01-20
Attending: FAMILY MEDICINE
Payer: COMMERCIAL

## 2021-01-20 DIAGNOSIS — Z12.31 ENCOUNTER FOR SCREENING MAMMOGRAM FOR MALIGNANT NEOPLASM OF BREAST: ICD-10-CM

## 2021-01-20 PROCEDURE — 77063 BREAST TOMOSYNTHESIS BI: CPT

## 2021-02-07 ENCOUNTER — HOSPITAL ENCOUNTER (EMERGENCY)
Facility: MEDICAL CENTER | Age: 56
End: 2021-02-07
Attending: EMERGENCY MEDICINE
Payer: COMMERCIAL

## 2021-02-07 VITALS
HEIGHT: 64 IN | DIASTOLIC BLOOD PRESSURE: 70 MMHG | SYSTOLIC BLOOD PRESSURE: 134 MMHG | WEIGHT: 115.08 LBS | BODY MASS INDEX: 19.65 KG/M2 | HEART RATE: 100 BPM | OXYGEN SATURATION: 96 % | RESPIRATION RATE: 18 BRPM

## 2021-02-07 DIAGNOSIS — G43.009 MIGRAINE WITHOUT AURA AND WITHOUT STATUS MIGRAINOSUS, NOT INTRACTABLE: ICD-10-CM

## 2021-02-07 DIAGNOSIS — R11.2 NON-INTRACTABLE VOMITING WITH NAUSEA, UNSPECIFIED VOMITING TYPE: ICD-10-CM

## 2021-02-07 PROCEDURE — 96374 THER/PROPH/DIAG INJ IV PUSH: CPT

## 2021-02-07 PROCEDURE — 700111 HCHG RX REV CODE 636 W/ 250 OVERRIDE (IP): Performed by: EMERGENCY MEDICINE

## 2021-02-07 PROCEDURE — 700105 HCHG RX REV CODE 258: Performed by: EMERGENCY MEDICINE

## 2021-02-07 PROCEDURE — 700102 HCHG RX REV CODE 250 W/ 637 OVERRIDE(OP): Performed by: EMERGENCY MEDICINE

## 2021-02-07 PROCEDURE — A9270 NON-COVERED ITEM OR SERVICE: HCPCS | Performed by: EMERGENCY MEDICINE

## 2021-02-07 PROCEDURE — 96375 TX/PRO/DX INJ NEW DRUG ADDON: CPT

## 2021-02-07 PROCEDURE — 99284 EMERGENCY DEPT VISIT MOD MDM: CPT

## 2021-02-07 RX ORDER — DEXAMETHASONE SODIUM PHOSPHATE 4 MG/ML
10 INJECTION, SOLUTION INTRA-ARTICULAR; INTRALESIONAL; INTRAMUSCULAR; INTRAVENOUS; SOFT TISSUE ONCE
Status: COMPLETED | OUTPATIENT
Start: 2021-02-07 | End: 2021-02-07

## 2021-02-07 RX ORDER — SODIUM CHLORIDE 9 MG/ML
1000 INJECTION, SOLUTION INTRAVENOUS ONCE
Status: DISCONTINUED | OUTPATIENT
Start: 2021-02-07 | End: 2021-02-08 | Stop reason: HOSPADM

## 2021-02-07 RX ORDER — HYDROCODONE BITARTRATE AND ACETAMINOPHEN 5; 325 MG/1; MG/1
1 TABLET ORAL ONCE
Status: COMPLETED | OUTPATIENT
Start: 2021-02-07 | End: 2021-02-07

## 2021-02-07 RX ORDER — PROCHLORPERAZINE EDISYLATE 5 MG/ML
10 INJECTION INTRAMUSCULAR; INTRAVENOUS ONCE
Status: COMPLETED | OUTPATIENT
Start: 2021-02-07 | End: 2021-02-07

## 2021-02-07 RX ORDER — SODIUM CHLORIDE 9 MG/ML
1000 INJECTION, SOLUTION INTRAVENOUS ONCE
Status: COMPLETED | OUTPATIENT
Start: 2021-02-07 | End: 2021-02-07

## 2021-02-07 RX ORDER — ONDANSETRON 4 MG/1
4 TABLET, ORALLY DISINTEGRATING ORAL EVERY 8 HOURS PRN
Qty: 10 TAB | Refills: 0 | Status: SHIPPED | OUTPATIENT
Start: 2021-02-07

## 2021-02-07 RX ORDER — ONDANSETRON 2 MG/ML
4 INJECTION INTRAMUSCULAR; INTRAVENOUS ONCE
Status: COMPLETED | OUTPATIENT
Start: 2021-02-07 | End: 2021-02-07

## 2021-02-07 RX ADMIN — PROCHLORPERAZINE EDISYLATE 10 MG: 5 INJECTION INTRAMUSCULAR; INTRAVENOUS at 21:55

## 2021-02-07 RX ADMIN — DEXAMETHASONE SODIUM PHOSPHATE 10 MG: 4 INJECTION, SOLUTION INTRA-ARTICULAR; INTRALESIONAL; INTRAMUSCULAR; INTRAVENOUS; SOFT TISSUE at 21:56

## 2021-02-07 RX ADMIN — ONDANSETRON 4 MG: 2 INJECTION INTRAMUSCULAR; INTRAVENOUS at 21:55

## 2021-02-07 RX ADMIN — HYDROCODONE BITARTRATE AND ACETAMINOPHEN 1 TABLET: 5; 325 TABLET ORAL at 21:56

## 2021-02-07 RX ADMIN — SODIUM CHLORIDE 1000 ML: 9 INJECTION, SOLUTION INTRAVENOUS at 22:00

## 2021-02-07 ASSESSMENT — FIBROSIS 4 INDEX: FIB4 SCORE: 1.04

## 2021-02-08 NOTE — ED NOTES
Pt to room via wheelchair from [facility]. Changed into gown. Connected to monitor. Agree with triage note. Chart up for ERP. Call light in reach.

## 2021-02-08 NOTE — ED TRIAGE NOTES
"Valerie White Priya Valarie  55 y.o. female  Chief Complaint   Patient presents with   • Migraine     Reports having a migraine that started at 0500 today, pt has chronic migraines and has come to the ED multiple times in the past for the same symptoms. Took sumatriptan and hydromorphone today without effect. +N/V. Pt came to the ED with concerns for dehydration.   • N/V     +N/V in the triage room.    Pt ambulatory to triage with steady gait for above complaint.   Pt is alert and oriented, speaking in full sentences, follows commands and responds appropriately to questions. Resp are even and unlabored. No behavioral indicators of pain.   Pt placed in lobby. Pt educated on triage process. Pt encouraged to alert staff for any changes.    /70   Pulse 100   Resp 18   Ht 1.626 m (5' 4\")   Wt 52.2 kg (115 lb 1.3 oz)   SpO2 96%     "

## 2021-02-08 NOTE — ED PROVIDER NOTES
ED Provider Note    Scribed for Preston Sterling M.D. by Wendy Johnston. 2/7/2021, 9:34 PM.    Primary care provider: Kayy Fabian M.D.  Means of arrival: Walk in  History obtained from: Patient   History limited by: None    CHIEF COMPLAINT  Chief Complaint   Patient presents with   • Migraine     Reports having a migraine that started at 0500 today, pt has chronic migraines and has come to the ED multiple times in the past for the same symptoms. Took sumatriptan and hydromorphone today without effect. +N/V. Pt came to the ED with concerns for dehydration.   • N/V       HPI  Valerie Sheppard is a 55 y.o. female who presents to the Emergency Department for an acute, constant migraine headache onset this morning. She reports taking sumatriptan and hydromorphone today, but had no alleviation of symptoms. She has a history of migraines, and her current headache is similar to her previous episodes. She has associated vomiting and photophobia. She denies visual disruptions, fevers, chills, shortness of breath, cough, diarrhea, paraesthesias, speech difficulties, and phonophobia. Patient denies having COVID contact.     REVIEW OF SYSTEMS  Pertinent positives include vomiting and photophobia. Pertinent negatives include visual disruptions, fevers, chills, shortness of breath, cough, diarrhea, numb, ting, trouble findingwords, and phonophobia.     PAST MEDICAL HISTORY   has a past medical history of Insomnia and Migraine.    SURGICAL HISTORY   has a past surgical history that includes cataract extraction with iol.    SOCIAL HISTORY  Social History     Tobacco Use   • Smoking status: Never Smoker   • Smokeless tobacco: Never Used   Substance Use Topics   • Alcohol use: No     Alcohol/week: 0.0 oz     Frequency: Monthly or less     Drinks per session: 1 or 2     Binge frequency: Never     Comment: socially   • Drug use: No      Social History     Substance and Sexual Activity   Drug Use No       FAMILY  "HISTORY  Family History   Problem Relation Age of Onset   • Cancer Mother    • Other Father         scleroderma   • Hypertension Father    • Heart Disease Neg Hx        CURRENT MEDICATIONS  Home Medications     Reviewed by Francisca Rao R.N. (Registered Nurse) on 02/07/21 at 2024  Med List Status: Complete   Medication Last Dose Status   amitriptyline (ELAVIL) 25 MG Tab  Active   fluticasone (FLONASE) 50 MCG/ACT nasal spray  Active   HYDROmorphone (DILAUDID) 2 MG Tab  Active   PARoxetine (PAXIL) 20 MG Tab  Active   promethazine (PHENERGAN) 25 MG Suppos  Active   sumatriptan (IMITREX) 100 MG tablet  Active   topiramate (TOPAMAX) 50 MG tablet  Active                ALLERGIES  Allergies   Allergen Reactions   • Bactrim Swelling   • Sulfa Drugs Swelling       PHYSICAL EXAM  VITAL SIGNS: /70   Pulse 100   Resp 18   Ht 1.626 m (5' 4\")   Wt 52.2 kg (115 lb 1.3 oz)   SpO2 96%   BMI 19.75 kg/m²     Constitutional: Well developed, Well nourished, mild distress.   HENT: Normocephalic, Atraumatic, mask in place.  Eyes: Conjunctiva normal, No discharge.   Neck: Supple, No stridor. No meningismus  Cardiovascular: Normal heart rate, Normal rhythm, No murmurs, equal pulses.   Pulmonary: Normal breath sounds, No respiratory distress, No wheezing, No rales, No rhonchi.  Abdomen:Soft, No tenderness.   Musculoskeletal: No major deformities noted, No tenderness.   Skin: Warm, Dry, No erythema, No rash.   Neurologic: Alert & oriented x 3, Normal motor function,  No focal deficits noted. Normal finger to nose, Normal cranial nerves II-XII, No pronator drift. Equal strength in upper and lower extremities bilaterally.  Psychiatric: Affect normal, Judgment normal, Mood normal.     COURSE & MEDICAL DECISION MAKING  Pertinent Labs & Imaging studies reviewed. (See chart for details)    9:34 PM - Patient seen and examined at bedside. Patient will be treated with Decadron 10 mg, Norco 1 tablet, Zofran 4 mg, and Compazine 10 " mg.The differential diagnoses include but are not limited to: Migraine, tension headache    10:59 PM - Patient was reevaluated at bedside. Patient feels improved and discussed plans for discharge. She is agreeable and understanding to discharge.    Medical Decision Making: This point time I believe the patient is having her migraine.  Her headache went away with migraine cocktail.  This feels like her previous migraines and she has a long history of this.  Her headache was not thunderclap in nature not the worst headache of her life I do not think this is a subarachnoid hemorrhage.  She does not have a fever or neck stiffness I do not think this is meningitis.    FINAL IMPRESSION  1. Migraine without aura and without status migrainosus, not intractable    2. Non-intractable vomiting with nausea, unspecified vomiting type        PRESCRIPTIONS  Discharge Medication List as of 2/7/2021 10:54 PM      START taking these medications    Details   ondansetron (ZOFRAN ODT) 4 MG TABLET DISPERSIBLE Take 1 Tab by mouth every 8 hours as needed., Disp-10 Tab, R-0, Print Rx Paper             FOLLOW UP  Kayy Fabian M.D.  [address] [address]  [phone]    Schedule an appointment as soon as possible for a visit in 3 days  As needed        -DISCHARGE-      FINAL IMPRESSION  1. Migraine without aura and without status migrainosus, not intractable    2. Non-intractable vomiting with nausea, unspecified vomiting type          IWendy), am scribing for, and in the presence of, Preston Sterling M.D.    Electronically signed by: Wendy Johnston ([place]), 2/7/2021    IPreston M.D. personally performed the services described in this documentation, as scribed by Wendy Johnston in my presence, and it is both accurate and complete. E.    The note accurately reflects work and decisions made by me.  Preston Sterling M.D.  2/8/2021  12:23 AM

## 2021-02-08 NOTE — DISCHARGE INSTRUCTIONS
Return the emergency department if you have new or different headache, neck stiffness, fever, severe vomiting, confusion or stroke symptoms.

## 2021-03-15 DIAGNOSIS — Z23 NEED FOR VACCINATION: ICD-10-CM

## 2021-03-17 ENCOUNTER — IMMUNIZATION (OUTPATIENT)
Dept: FAMILY PLANNING/WOMEN'S HEALTH CLINIC | Facility: IMMUNIZATION CENTER | Age: 56
End: 2021-03-17
Attending: INTERNAL MEDICINE
Payer: OTHER MISCELLANEOUS

## 2021-03-17 DIAGNOSIS — Z23 ENCOUNTER FOR VACCINATION: Primary | ICD-10-CM

## 2021-03-17 DIAGNOSIS — Z23 NEED FOR VACCINATION: ICD-10-CM

## 2021-03-17 PROCEDURE — 0001A PFIZER SARS-COV-2 VACCINE: CPT

## 2021-03-17 PROCEDURE — 91300 PFIZER SARS-COV-2 VACCINE: CPT

## 2021-04-08 ENCOUNTER — IMMUNIZATION (OUTPATIENT)
Dept: FAMILY PLANNING/WOMEN'S HEALTH CLINIC | Facility: IMMUNIZATION CENTER | Age: 56
End: 2021-04-08
Attending: INTERNAL MEDICINE
Payer: OTHER MISCELLANEOUS

## 2021-04-08 DIAGNOSIS — Z23 ENCOUNTER FOR VACCINATION: Primary | ICD-10-CM

## 2021-04-08 PROCEDURE — 0002A PFIZER SARS-COV-2 VACCINE: CPT

## 2021-04-08 PROCEDURE — 91300 PFIZER SARS-COV-2 VACCINE: CPT

## 2022-05-10 ENCOUNTER — HOSPITAL ENCOUNTER (OUTPATIENT)
Dept: RADIOLOGY | Facility: MEDICAL CENTER | Age: 57
End: 2022-05-10
Attending: FAMILY MEDICINE
Payer: OTHER MISCELLANEOUS

## 2022-05-10 DIAGNOSIS — Z12.31 VISIT FOR SCREENING MAMMOGRAM: ICD-10-CM

## 2022-05-10 PROCEDURE — 77063 BREAST TOMOSYNTHESIS BI: CPT

## 2022-06-29 ENCOUNTER — HOSPITAL ENCOUNTER (OUTPATIENT)
Dept: RADIOLOGY | Facility: MEDICAL CENTER | Age: 57
End: 2022-06-29
Payer: COMMERCIAL

## 2022-07-05 ENCOUNTER — TELEPHONE (OUTPATIENT)
Dept: RADIOLOGY | Facility: MEDICAL CENTER | Age: 57
End: 2022-07-05
Payer: COMMERCIAL

## 2023-03-07 ENCOUNTER — OFFICE VISIT (OUTPATIENT)
Dept: URGENT CARE | Facility: PHYSICIAN GROUP | Age: 58
End: 2023-03-07
Payer: COMMERCIAL

## 2023-03-07 ENCOUNTER — HOSPITAL ENCOUNTER (OUTPATIENT)
Dept: RADIOLOGY | Facility: MEDICAL CENTER | Age: 58
End: 2023-03-07
Attending: PHYSICIAN ASSISTANT
Payer: COMMERCIAL

## 2023-03-07 VITALS
WEIGHT: 119.93 LBS | BODY MASS INDEX: 20.47 KG/M2 | DIASTOLIC BLOOD PRESSURE: 70 MMHG | HEART RATE: 109 BPM | OXYGEN SATURATION: 97 % | HEIGHT: 64 IN | RESPIRATION RATE: 14 BRPM | SYSTOLIC BLOOD PRESSURE: 122 MMHG | TEMPERATURE: 99.5 F

## 2023-03-07 DIAGNOSIS — M79.671 FOOT PAIN, RIGHT: ICD-10-CM

## 2023-03-07 DIAGNOSIS — S92.351P CLOSED DISPLACED FRACTURE OF FIFTH METATARSAL BONE OF RIGHT FOOT WITH MALUNION, SUBSEQUENT ENCOUNTER: ICD-10-CM

## 2023-03-07 PROCEDURE — 73630 X-RAY EXAM OF FOOT: CPT | Mod: RT

## 2023-03-07 PROCEDURE — 99214 OFFICE O/P EST MOD 30 MIN: CPT | Performed by: PHYSICIAN ASSISTANT

## 2023-03-08 NOTE — PROGRESS NOTES
"Subjective:   Valerie Sheppard is a 57 y.o. female who presents for Foot Injury (Right foot injury , tripped while walking up stairs x 3 days ./Swelling and pain )     Was running up some stairs over the weekend, fell and hit her left shin, then fell a second time and fell onto her right ankley.  Thinks she may have rolled her ankle.  Saw first aid.  Since then she has had increased swelling and discoloartion.  Pain primarily with weight bearing.  Pain primarily to lateral surface of the foot.        Medications:  amitriptyline Tabs  fluticasone  HYDROmorphone Tabs  ondansetron Tbdp  PARoxetine Tabs  promethazine Supp  sumatriptan  topiramate    Allergies:             Bactrim and Sulfa drugs    Surgical History:         Past Surgical History:   Procedure Laterality Date    CATARACT EXTRACTION WITH IOL         Past Social Hx:  Valerie Sheppard  reports that she has never smoked. She has never used smokeless tobacco. She reports that she does not drink alcohol and does not use drugs.     Past Family Hx:   Valerie Sheppard family history includes Cancer in her mother; Hypertension in her father; Other in her father.       Problem list, medications, and allergies reviewed by myself today in Epic.     Objective:     /70 (BP Location: Left arm, Patient Position: Sitting, BP Cuff Size: Adult)   Pulse (!) 109   Temp 37.5 °C (99.5 °F) (Temporal)   Resp 14   Ht 1.626 m (5' 4\")   Wt 54.4 kg (119 lb 14.9 oz)   SpO2 97%   BMI 20.59 kg/m²     Physical Exam  Vitals and nursing note reviewed.   Constitutional:       General: She is not in acute distress.     Appearance: Normal appearance. She is not ill-appearing, toxic-appearing or diaphoretic.   HENT:      Head: Normocephalic.      Right Ear: External ear normal.      Left Ear: External ear normal.      Nose: Nose normal.      Mouth/Throat:      Mouth: Mucous membranes are moist.   Eyes:      Extraocular Movements: " Extraocular movements intact.      Conjunctiva/sclera: Conjunctivae normal.      Pupils: Pupils are equal, round, and reactive to light.   Cardiovascular:      Rate and Rhythm: Normal rate.      Pulses: Normal pulses.   Pulmonary:      Effort: Pulmonary effort is normal. No respiratory distress.   Musculoskeletal:      Cervical back: Normal range of motion.        Feet:    Feet:      Comments: Ecchymosis noted to the lateral surface of the right foot extending to the dorsum and to the toes.  There is focal tenderness palpation along the course of the fifth metatarsal and MTP joint.  There is no medial or lateral malleoli tenderness.  No navicular tenderness.  Full range of motion with dorsiflexion plantarflexion.  Distal pulses intact.  Neurovascularly intact.  Skin:     General: Skin is warm.      Findings: No lesion or rash.   Neurological:      General: No focal deficit present.      Mental Status: She is alert and oriented to person, place, and time.   Psychiatric:         Thought Content: Thought content normal.         Judgment: Judgment normal.       RADIOLOGY RESULTS   DX-FOOT-COMPLETE 3+ RIGHT    Result Date: 3/7/2023  3/7/2023 5:20 PM HISTORY/REASON FOR EXAM: Right foot pain. Fell down stairs. TECHNIQUE/EXAM DESCRIPTION AND NUMBER OF VIEWS: 3 nonweightbearing views of the RIGHT foot. COMPARISON: FINDINGS: Bone mineralization is normal. There is a comminuted, angulated and displaced distal fifth metatarsal diaphyseal fracture. Soft tissues are normal.     Comminuted, displaced and angulated distal fifth metatarsal diaphyseal fracture.         .  Assessment/Plan:     Diagnosis and Associated Orders:     1. Foot pain, right  - DX-FOOT-COMPLETE 3+ RIGHT; Future    2. Closed displaced fracture of fifth metatarsal bone of right foot with malunion, subsequent encounter  - Referral to Orthopedics        Comments/MDM:    Comminuted displaced angulated distal fifth metatarsal fracture.  I believe this will require  urgent orthopedic follow-up.  Did place urgent referral to orthopedics however patient is planning to proceed to [employer] tomorrow.  Plans were to place her in a posterior splint but at this time will place in short boot and crutches given her plans to proceed to [employer] tomorrow.  Patient is to remain nonweightbearing.  Elevate, anti-inflammatories as needed, monitor for signs of compartment syndrome.  Will likely need ORIF.    I personally reviewed prior external notes and test results pertinent to today's visit.  Red flags discussed as well as indications to present to the Emergency Department.  Supportive care, natural history, differential diagnoses, and indications for immediate follow-up discussed.  Patient expresses understanding and agrees to plan.  Patient denies any other questions or concerns.    Follow-up with the primary care physician for recheck, reevaluation, and consideration of further management.      Please note that this dictation was created using voice recognition software. I have made a reasonable attempt to correct obvious errors, but I expect that there are errors of grammar and possibly content that I did not discover before finalizing the note.    This note was electronically signed by Cammy Cuadra PA-C